# Patient Record
Sex: FEMALE | Race: WHITE | Employment: OTHER | URBAN - METROPOLITAN AREA
[De-identification: names, ages, dates, MRNs, and addresses within clinical notes are randomized per-mention and may not be internally consistent; named-entity substitution may affect disease eponyms.]

---

## 2020-12-03 ENCOUNTER — HOSPITAL ENCOUNTER (INPATIENT)
Age: 70
LOS: 4 days | Discharge: ANOTHER HOSPITAL | DRG: 291 | End: 2020-12-08
Attending: SPECIALIST | Admitting: INTERNAL MEDICINE
Payer: MEDICARE

## 2020-12-03 ENCOUNTER — APPOINTMENT (OUTPATIENT)
Dept: GENERAL RADIOLOGY | Age: 70
DRG: 291 | End: 2020-12-03
Payer: MEDICARE

## 2020-12-03 LAB
ABSOLUTE EOS #: 0 K/UL (ref 0–0.4)
ABSOLUTE IMMATURE GRANULOCYTE: ABNORMAL K/UL (ref 0–0.3)
ABSOLUTE LYMPH #: 0.8 K/UL (ref 1–4.8)
ABSOLUTE MONO #: 0.5 K/UL (ref 0.1–1.2)
ANION GAP SERPL CALCULATED.3IONS-SCNC: 10 MMOL/L (ref 9–17)
BASOPHILS # BLD: 1 % (ref 0–2)
BASOPHILS ABSOLUTE: 0.1 K/UL (ref 0–0.2)
BNP INTERPRETATION: ABNORMAL
BUN BLDV-MCNC: 52 MG/DL (ref 8–23)
BUN/CREAT BLD: ABNORMAL (ref 9–20)
CALCIUM SERPL-MCNC: 10.3 MG/DL (ref 8.6–10.4)
CHLORIDE BLD-SCNC: 94 MMOL/L (ref 98–107)
CO2: 35 MMOL/L (ref 20–31)
CREAT SERPL-MCNC: 2.4 MG/DL (ref 0.5–0.9)
D-DIMER QUANTITATIVE: 6.55 MG/L FEU
DIFFERENTIAL TYPE: ABNORMAL
EOSINOPHILS RELATIVE PERCENT: 0 % (ref 1–4)
GFR AFRICAN AMERICAN: 24 ML/MIN
GFR NON-AFRICAN AMERICAN: 20 ML/MIN
GFR SERPL CREATININE-BSD FRML MDRD: ABNORMAL ML/MIN/{1.73_M2}
GFR SERPL CREATININE-BSD FRML MDRD: ABNORMAL ML/MIN/{1.73_M2}
GLUCOSE BLD-MCNC: 147 MG/DL (ref 70–99)
HCT VFR BLD CALC: 38.2 % (ref 36–46)
HEMOGLOBIN: 12.3 G/DL (ref 12–16)
IMMATURE GRANULOCYTES: ABNORMAL %
LACTIC ACID: 2.4 MMOL/L (ref 0.5–2.2)
LYMPHOCYTES # BLD: 8 % (ref 24–44)
MCH RBC QN AUTO: 31.3 PG (ref 26–34)
MCHC RBC AUTO-ENTMCNC: 32.4 G/DL (ref 31–37)
MCV RBC AUTO: 96.8 FL (ref 80–100)
MONOCYTES # BLD: 5 % (ref 2–11)
NRBC AUTOMATED: ABNORMAL PER 100 WBC
PDW BLD-RTO: 16.5 % (ref 12.5–15.4)
PLATELET # BLD: 186 K/UL (ref 140–450)
PLATELET ESTIMATE: ABNORMAL
PMV BLD AUTO: 9.9 FL (ref 6–12)
POTASSIUM SERPL-SCNC: 5 MMOL/L (ref 3.7–5.3)
PRO-BNP: ABNORMAL PG/ML
RBC # BLD: 3.94 M/UL (ref 4–5.2)
RBC # BLD: ABNORMAL 10*6/UL
SARS-COV-2, RAPID: NOT DETECTED
SARS-COV-2: NORMAL
SARS-COV-2: NORMAL
SEG NEUTROPHILS: 86 % (ref 36–66)
SEGMENTED NEUTROPHILS ABSOLUTE COUNT: 8.5 K/UL (ref 1.8–7.7)
SODIUM BLD-SCNC: 139 MMOL/L (ref 135–144)
SOURCE: NORMAL
TROPONIN INTERP: ABNORMAL
TROPONIN T: ABNORMAL NG/ML
TROPONIN, HIGH SENSITIVITY: 73 NG/L (ref 0–14)
WBC # BLD: 9.8 K/UL (ref 3.5–11)
WBC # BLD: ABNORMAL 10*3/UL

## 2020-12-03 PROCEDURE — 84484 ASSAY OF TROPONIN QUANT: CPT

## 2020-12-03 PROCEDURE — U0002 COVID-19 LAB TEST NON-CDC: HCPCS

## 2020-12-03 PROCEDURE — 71045 X-RAY EXAM CHEST 1 VIEW: CPT

## 2020-12-03 PROCEDURE — 83605 ASSAY OF LACTIC ACID: CPT

## 2020-12-03 PROCEDURE — 83880 ASSAY OF NATRIURETIC PEPTIDE: CPT

## 2020-12-03 PROCEDURE — 87040 BLOOD CULTURE FOR BACTERIA: CPT

## 2020-12-03 PROCEDURE — 80048 BASIC METABOLIC PNL TOTAL CA: CPT

## 2020-12-03 PROCEDURE — 99285 EMERGENCY DEPT VISIT HI MDM: CPT

## 2020-12-03 PROCEDURE — 85379 FIBRIN DEGRADATION QUANT: CPT

## 2020-12-03 PROCEDURE — 36415 COLL VENOUS BLD VENIPUNCTURE: CPT

## 2020-12-03 PROCEDURE — 85025 COMPLETE CBC W/AUTO DIFF WBC: CPT

## 2020-12-03 ASSESSMENT — PAIN DESCRIPTION - LOCATION: LOCATION: COCCYX

## 2020-12-03 ASSESSMENT — PAIN SCALES - GENERAL: PAINLEVEL_OUTOF10: 6

## 2020-12-04 ENCOUNTER — APPOINTMENT (OUTPATIENT)
Dept: GENERAL RADIOLOGY | Age: 70
DRG: 291 | End: 2020-12-04
Payer: MEDICARE

## 2020-12-04 ENCOUNTER — APPOINTMENT (OUTPATIENT)
Dept: NUCLEAR MEDICINE | Age: 70
DRG: 291 | End: 2020-12-04
Payer: MEDICARE

## 2020-12-04 PROBLEM — J44.9 COPD (CHRONIC OBSTRUCTIVE PULMONARY DISEASE) (HCC): Status: ACTIVE | Noted: 2018-04-05

## 2020-12-04 PROBLEM — I48.4 ATYPICAL ATRIAL FLUTTER (HCC): Status: ACTIVE | Noted: 2019-07-10

## 2020-12-04 PROBLEM — G47.30 SLEEP APNEA: Status: ACTIVE | Noted: 2018-04-05

## 2020-12-04 PROBLEM — I50.40 COMBINED SYSTOLIC AND DIASTOLIC CONGESTIVE HEART FAILURE (HCC): Status: ACTIVE | Noted: 2020-12-04

## 2020-12-04 PROBLEM — J44.1 CHRONIC OBSTRUCTIVE PULMONARY DISEASE WITH ACUTE EXACERBATION (HCC): Status: ACTIVE | Noted: 2018-04-05

## 2020-12-04 PROBLEM — R09.02 HYPOXIA: Status: ACTIVE | Noted: 2020-12-04

## 2020-12-04 PROBLEM — C41.9 MALIGNANT NEOPLASM OF BONE (HCC): Status: ACTIVE | Noted: 2018-04-05

## 2020-12-04 PROBLEM — I50.9 CHF (CONGESTIVE HEART FAILURE) (HCC): Status: ACTIVE | Noted: 2020-12-04

## 2020-12-04 PROBLEM — I10 HYPERTENSION: Status: ACTIVE | Noted: 2018-04-05

## 2020-12-04 PROBLEM — J18.9 PNEUMONIA DUE TO INFECTIOUS ORGANISM: Status: ACTIVE | Noted: 2020-12-03

## 2020-12-04 PROBLEM — I50.43 ACUTE ON CHRONIC COMBINED SYSTOLIC AND DIASTOLIC CONGESTIVE HEART FAILURE (HCC): Status: ACTIVE | Noted: 2020-12-04

## 2020-12-04 PROBLEM — G62.9 NEUROPATHY: Status: ACTIVE | Noted: 2018-04-05

## 2020-12-04 PROBLEM — J96.20 ACUTE ON CHRONIC RESPIRATORY FAILURE (HCC): Status: ACTIVE | Noted: 2020-12-04

## 2020-12-04 PROBLEM — I48.0 PAROXYSMAL ATRIAL FIBRILLATION (HCC): Status: ACTIVE | Noted: 2018-04-10

## 2020-12-04 LAB
ANION GAP SERPL CALCULATED.3IONS-SCNC: 6 MMOL/L (ref 9–17)
BUN BLDV-MCNC: 54 MG/DL (ref 8–23)
BUN/CREAT BLD: ABNORMAL (ref 9–20)
CALCIUM SERPL-MCNC: 10.1 MG/DL (ref 8.6–10.4)
CHLORIDE BLD-SCNC: 99 MMOL/L (ref 98–107)
CO2: 36 MMOL/L (ref 20–31)
CREAT SERPL-MCNC: 2.07 MG/DL (ref 0.5–0.9)
FIO2: 75
GFR AFRICAN AMERICAN: 29 ML/MIN
GFR NON-AFRICAN AMERICAN: 24 ML/MIN
GFR SERPL CREATININE-BSD FRML MDRD: ABNORMAL ML/MIN/{1.73_M2}
GFR SERPL CREATININE-BSD FRML MDRD: ABNORMAL ML/MIN/{1.73_M2}
GLUCOSE BLD-MCNC: 128 MG/DL (ref 70–99)
GLUCOSE BLD-MCNC: 338 MG/DL (ref 65–105)
GLUCOSE BLD-MCNC: 348 MG/DL (ref 65–105)
HCT VFR BLD CALC: 37.8 % (ref 36–46)
HEMOGLOBIN: 12.3 G/DL (ref 12–16)
LACTIC ACID, WHOLE BLOOD: NORMAL MMOL/L (ref 0.7–2.1)
LACTIC ACID: 2 MMOL/L (ref 0.5–2.2)
LV EF: 33 %
LVEF MODALITY: NORMAL
MAGNESIUM: 1.9 MG/DL (ref 1.6–2.6)
MCH RBC QN AUTO: 31.3 PG (ref 26–34)
MCHC RBC AUTO-ENTMCNC: 32.5 G/DL (ref 31–37)
MCV RBC AUTO: 96.3 FL (ref 80–100)
NEGATIVE BASE EXCESS, ART: ABNORMAL (ref 0–2)
NRBC AUTOMATED: ABNORMAL PER 100 WBC
O2 DEVICE/FLOW/%: ABNORMAL
PARTIAL THROMBOPLASTIN TIME: 29.4 SEC (ref 21.3–31.3)
PATIENT TEMP: ABNORMAL
PDW BLD-RTO: 16.6 % (ref 12.5–15.4)
PLATELET # BLD: 148 K/UL (ref 140–450)
PMV BLD AUTO: 9.8 FL (ref 6–12)
POC HCO3: 38.5 MMOL/L (ref 22–27)
POC O2 SATURATION: 96 %
POC PCO2 TEMP: ABNORMAL MM HG
POC PCO2: 64 MM HG (ref 32–45)
POC PH TEMP: ABNORMAL
POC PH: 7.38 (ref 7.35–7.45)
POC PO2 TEMP: ABNORMAL MM HG
POC PO2: 89 MM HG (ref 75–95)
POSITIVE BASE EXCESS, ART: 13 (ref 0–2)
POTASSIUM SERPL-SCNC: 4.6 MMOL/L (ref 3.7–5.3)
PROCALCITONIN: 0.95 NG/ML
RBC # BLD: 3.93 M/UL (ref 4–5.2)
SODIUM BLD-SCNC: 141 MMOL/L (ref 135–144)
TCO2 (CALC), ART: 40 MMOL/L (ref 23–28)
TROPONIN INTERP: ABNORMAL
TROPONIN T: ABNORMAL NG/ML
TROPONIN, HIGH SENSITIVITY: 61 NG/L (ref 0–14)
TROPONIN, HIGH SENSITIVITY: 63 NG/L (ref 0–14)
TROPONIN, HIGH SENSITIVITY: 68 NG/L (ref 0–14)
WBC # BLD: 7.5 K/UL (ref 3.5–11)

## 2020-12-04 PROCEDURE — 84145 PROCALCITONIN (PCT): CPT

## 2020-12-04 PROCEDURE — 87070 CULTURE OTHR SPECIMN AEROBIC: CPT

## 2020-12-04 PROCEDURE — 2580000003 HC RX 258: Performed by: NURSE PRACTITIONER

## 2020-12-04 PROCEDURE — 6370000000 HC RX 637 (ALT 250 FOR IP): Performed by: NURSE PRACTITIONER

## 2020-12-04 PROCEDURE — 2000000000 HC ICU R&B

## 2020-12-04 PROCEDURE — 99223 1ST HOSP IP/OBS HIGH 75: CPT | Performed by: INTERNAL MEDICINE

## 2020-12-04 PROCEDURE — 94640 AIRWAY INHALATION TREATMENT: CPT

## 2020-12-04 PROCEDURE — 6360000002 HC RX W HCPCS: Performed by: NURSE PRACTITIONER

## 2020-12-04 PROCEDURE — 83605 ASSAY OF LACTIC ACID: CPT

## 2020-12-04 PROCEDURE — A9540 TC99M MAA: HCPCS | Performed by: NURSE PRACTITIONER

## 2020-12-04 PROCEDURE — 94660 CPAP INITIATION&MGMT: CPT

## 2020-12-04 PROCEDURE — 93005 ELECTROCARDIOGRAM TRACING: CPT | Performed by: SPECIALIST

## 2020-12-04 PROCEDURE — 83735 ASSAY OF MAGNESIUM: CPT

## 2020-12-04 PROCEDURE — 6360000002 HC RX W HCPCS: Performed by: INTERNAL MEDICINE

## 2020-12-04 PROCEDURE — 94761 N-INVAS EAR/PLS OXIMETRY MLT: CPT

## 2020-12-04 PROCEDURE — 36600 WITHDRAWAL OF ARTERIAL BLOOD: CPT

## 2020-12-04 PROCEDURE — 82803 BLOOD GASES ANY COMBINATION: CPT

## 2020-12-04 PROCEDURE — 80048 BASIC METABOLIC PNL TOTAL CA: CPT

## 2020-12-04 PROCEDURE — 36415 COLL VENOUS BLD VENIPUNCTURE: CPT

## 2020-12-04 PROCEDURE — 85027 COMPLETE CBC AUTOMATED: CPT

## 2020-12-04 PROCEDURE — 78580 LUNG PERFUSION IMAGING: CPT

## 2020-12-04 PROCEDURE — 85730 THROMBOPLASTIN TIME PARTIAL: CPT

## 2020-12-04 PROCEDURE — 6360000002 HC RX W HCPCS: Performed by: SPECIALIST

## 2020-12-04 PROCEDURE — 2700000000 HC OXYGEN THERAPY PER DAY

## 2020-12-04 PROCEDURE — 84484 ASSAY OF TROPONIN QUANT: CPT

## 2020-12-04 PROCEDURE — 3430000000 HC RX DIAGNOSTIC RADIOPHARMACEUTICAL: Performed by: NURSE PRACTITIONER

## 2020-12-04 PROCEDURE — 86403 PARTICLE AGGLUT ANTBDY SCRN: CPT

## 2020-12-04 PROCEDURE — 93306 TTE W/DOPPLER COMPLETE: CPT

## 2020-12-04 PROCEDURE — 2580000003 HC RX 258: Performed by: SPECIALIST

## 2020-12-04 PROCEDURE — 82947 ASSAY GLUCOSE BLOOD QUANT: CPT

## 2020-12-04 PROCEDURE — APPSS180 APP SPLIT SHARED TIME > 60 MINUTES: Performed by: NURSE PRACTITIONER

## 2020-12-04 PROCEDURE — 87205 SMEAR GRAM STAIN: CPT

## 2020-12-04 PROCEDURE — 6370000000 HC RX 637 (ALT 250 FOR IP): Performed by: INTERNAL MEDICINE

## 2020-12-04 RX ORDER — DEXTROSE MONOHYDRATE 25 G/50ML
12.5 INJECTION, SOLUTION INTRAVENOUS PRN
Status: DISCONTINUED | OUTPATIENT
Start: 2020-12-04 | End: 2020-12-08 | Stop reason: HOSPADM

## 2020-12-04 RX ORDER — MEGESTROL ACETATE 40 MG/1
40 TABLET ORAL 2 TIMES DAILY
COMMUNITY

## 2020-12-04 RX ORDER — IPRATROPIUM BROMIDE AND ALBUTEROL SULFATE 2.5; .5 MG/3ML; MG/3ML
1 SOLUTION RESPIRATORY (INHALATION)
Status: DISCONTINUED | OUTPATIENT
Start: 2020-12-04 | End: 2020-12-08 | Stop reason: HOSPADM

## 2020-12-04 RX ORDER — CARVEDILOL 3.12 MG/1
3.12 TABLET ORAL 2 TIMES DAILY WITH MEALS
Status: DISCONTINUED | OUTPATIENT
Start: 2020-12-05 | End: 2020-12-07

## 2020-12-04 RX ORDER — ACETAZOLAMIDE 500 MG/5ML
250 INJECTION, POWDER, LYOPHILIZED, FOR SOLUTION INTRAVENOUS EVERY 12 HOURS
Status: DISCONTINUED | OUTPATIENT
Start: 2020-12-04 | End: 2020-12-04

## 2020-12-04 RX ORDER — POLYETHYLENE GLYCOL 3350 17 G/17G
17 POWDER, FOR SOLUTION ORAL DAILY PRN
Status: DISCONTINUED | OUTPATIENT
Start: 2020-12-04 | End: 2020-12-08 | Stop reason: HOSPADM

## 2020-12-04 RX ORDER — HEPARIN SODIUM 1000 [USP'U]/ML
80 INJECTION, SOLUTION INTRAVENOUS; SUBCUTANEOUS PRN
Status: DISCONTINUED | OUTPATIENT
Start: 2020-12-04 | End: 2020-12-04

## 2020-12-04 RX ORDER — METHYLPREDNISOLONE SODIUM SUCCINATE 125 MG/2ML
60 INJECTION, POWDER, LYOPHILIZED, FOR SOLUTION INTRAMUSCULAR; INTRAVENOUS EVERY 6 HOURS
Status: DISCONTINUED | OUTPATIENT
Start: 2020-12-04 | End: 2020-12-04

## 2020-12-04 RX ORDER — HEPARIN SODIUM 1000 [USP'U]/ML
80 INJECTION, SOLUTION INTRAVENOUS; SUBCUTANEOUS ONCE
Status: COMPLETED | OUTPATIENT
Start: 2020-12-04 | End: 2020-12-04

## 2020-12-04 RX ORDER — ACETAMINOPHEN 325 MG/1
650 TABLET ORAL EVERY 6 HOURS PRN
Status: DISCONTINUED | OUTPATIENT
Start: 2020-12-04 | End: 2020-12-08 | Stop reason: HOSPADM

## 2020-12-04 RX ORDER — ALBUTEROL SULFATE 2.5 MG/3ML
2.5 SOLUTION RESPIRATORY (INHALATION)
Status: DISCONTINUED | OUTPATIENT
Start: 2020-12-04 | End: 2020-12-08 | Stop reason: HOSPADM

## 2020-12-04 RX ORDER — METHYLPREDNISOLONE SODIUM SUCCINATE 125 MG/2ML
125 INJECTION, POWDER, LYOPHILIZED, FOR SOLUTION INTRAMUSCULAR; INTRAVENOUS ONCE
Status: COMPLETED | OUTPATIENT
Start: 2020-12-04 | End: 2020-12-04

## 2020-12-04 RX ORDER — OXYCODONE HYDROCHLORIDE AND ACETAMINOPHEN 5; 325 MG/1; MG/1
1 TABLET ORAL EVERY 6 HOURS PRN
Status: ON HOLD | COMMUNITY
End: 2020-12-09 | Stop reason: SDUPTHER

## 2020-12-04 RX ORDER — SODIUM CHLORIDE 0.9 % (FLUSH) 0.9 %
10 SYRINGE (ML) INJECTION ONCE
Status: DISCONTINUED | OUTPATIENT
Start: 2020-12-04 | End: 2020-12-08 | Stop reason: HOSPADM

## 2020-12-04 RX ORDER — ONDANSETRON 2 MG/ML
4 INJECTION INTRAMUSCULAR; INTRAVENOUS EVERY 6 HOURS PRN
Status: DISCONTINUED | OUTPATIENT
Start: 2020-12-04 | End: 2020-12-08 | Stop reason: HOSPADM

## 2020-12-04 RX ORDER — DILTIAZEM HYDROCHLORIDE 240 MG/1
360 CAPSULE, EXTENDED RELEASE ORAL DAILY
Status: ON HOLD | COMMUNITY
End: 2020-12-09 | Stop reason: HOSPADM

## 2020-12-04 RX ORDER — DEXTROSE MONOHYDRATE 50 MG/ML
100 INJECTION, SOLUTION INTRAVENOUS PRN
Status: DISCONTINUED | OUTPATIENT
Start: 2020-12-04 | End: 2020-12-08 | Stop reason: HOSPADM

## 2020-12-04 RX ORDER — OMEPRAZOLE 20 MG/1
20 CAPSULE, DELAYED RELEASE ORAL DAILY
COMMUNITY

## 2020-12-04 RX ORDER — LATANOPROST 50 UG/ML
1 SOLUTION/ DROPS OPHTHALMIC NIGHTLY
COMMUNITY

## 2020-12-04 RX ORDER — HEPARIN SODIUM 1000 [USP'U]/ML
40 INJECTION, SOLUTION INTRAVENOUS; SUBCUTANEOUS PRN
Status: DISCONTINUED | OUTPATIENT
Start: 2020-12-04 | End: 2020-12-04

## 2020-12-04 RX ORDER — OXYCODONE HYDROCHLORIDE AND ACETAMINOPHEN 5; 325 MG/1; MG/1
1 TABLET ORAL EVERY 6 HOURS PRN
Status: DISCONTINUED | OUTPATIENT
Start: 2020-12-04 | End: 2020-12-08 | Stop reason: HOSPADM

## 2020-12-04 RX ORDER — LATANOPROST 50 UG/ML
1 SOLUTION/ DROPS OPHTHALMIC NIGHTLY
Status: DISCONTINUED | OUTPATIENT
Start: 2020-12-04 | End: 2020-12-08 | Stop reason: HOSPADM

## 2020-12-04 RX ORDER — DILTIAZEM HYDROCHLORIDE 180 MG/1
360 CAPSULE, COATED, EXTENDED RELEASE ORAL DAILY
Status: DISCONTINUED | OUTPATIENT
Start: 2020-12-04 | End: 2020-12-06

## 2020-12-04 RX ORDER — SODIUM CHLORIDE 0.9 % (FLUSH) 0.9 %
10 SYRINGE (ML) INJECTION PRN
Status: DISCONTINUED | OUTPATIENT
Start: 2020-12-04 | End: 2020-12-08 | Stop reason: HOSPADM

## 2020-12-04 RX ORDER — FUROSEMIDE 10 MG/ML
40 INJECTION INTRAMUSCULAR; INTRAVENOUS EVERY 12 HOURS
Status: DISCONTINUED | OUTPATIENT
Start: 2020-12-04 | End: 2020-12-06

## 2020-12-04 RX ORDER — PANTOPRAZOLE SODIUM 40 MG/1
40 TABLET, DELAYED RELEASE ORAL
Status: DISCONTINUED | OUTPATIENT
Start: 2020-12-04 | End: 2020-12-08 | Stop reason: HOSPADM

## 2020-12-04 RX ORDER — TRIAMCINOLONE ACETONIDE 0.25 MG/G
CREAM TOPICAL 2 TIMES DAILY PRN
Status: ON HOLD | COMMUNITY
End: 2020-12-09 | Stop reason: HOSPADM

## 2020-12-04 RX ORDER — FUROSEMIDE 10 MG/ML
40 INJECTION INTRAMUSCULAR; INTRAVENOUS EVERY 12 HOURS
Status: DISCONTINUED | OUTPATIENT
Start: 2020-12-05 | End: 2020-12-04

## 2020-12-04 RX ORDER — SODIUM CHLORIDE 0.9 % (FLUSH) 0.9 %
10 SYRINGE (ML) INJECTION EVERY 12 HOURS SCHEDULED
Status: DISCONTINUED | OUTPATIENT
Start: 2020-12-04 | End: 2020-12-08 | Stop reason: HOSPADM

## 2020-12-04 RX ORDER — NICOTINE POLACRILEX 4 MG
15 LOZENGE BUCCAL PRN
Status: DISCONTINUED | OUTPATIENT
Start: 2020-12-04 | End: 2020-12-08 | Stop reason: HOSPADM

## 2020-12-04 RX ORDER — ASPIRIN 81 MG/1
81 TABLET ORAL DAILY
Status: DISCONTINUED | OUTPATIENT
Start: 2020-12-04 | End: 2020-12-08 | Stop reason: HOSPADM

## 2020-12-04 RX ORDER — PROMETHAZINE HYDROCHLORIDE 25 MG/1
12.5 TABLET ORAL EVERY 6 HOURS PRN
Status: DISCONTINUED | OUTPATIENT
Start: 2020-12-04 | End: 2020-12-08 | Stop reason: HOSPADM

## 2020-12-04 RX ORDER — ASPIRIN 81 MG/1
81 TABLET ORAL DAILY
COMMUNITY

## 2020-12-04 RX ORDER — NICOTINE POLACRILEX 2 MG
1000 GUM BUCCAL
COMMUNITY

## 2020-12-04 RX ORDER — METHYLPREDNISOLONE SODIUM SUCCINATE 40 MG/ML
40 INJECTION, POWDER, LYOPHILIZED, FOR SOLUTION INTRAMUSCULAR; INTRAVENOUS EVERY 8 HOURS
Status: DISCONTINUED | OUTPATIENT
Start: 2020-12-05 | End: 2020-12-05

## 2020-12-04 RX ORDER — ACETAMINOPHEN 650 MG/1
650 SUPPOSITORY RECTAL EVERY 6 HOURS PRN
Status: DISCONTINUED | OUTPATIENT
Start: 2020-12-04 | End: 2020-12-08 | Stop reason: HOSPADM

## 2020-12-04 RX ORDER — FUROSEMIDE 10 MG/ML
40 INJECTION INTRAMUSCULAR; INTRAVENOUS ONCE
Status: COMPLETED | OUTPATIENT
Start: 2020-12-04 | End: 2020-12-04

## 2020-12-04 RX ORDER — MEGESTROL ACETATE 40 MG/ML
40 SUSPENSION ORAL 2 TIMES DAILY
Status: DISCONTINUED | OUTPATIENT
Start: 2020-12-04 | End: 2020-12-08 | Stop reason: HOSPADM

## 2020-12-04 RX ORDER — HEPARIN SODIUM 10000 [USP'U]/100ML
18 INJECTION, SOLUTION INTRAVENOUS CONTINUOUS
Status: DISCONTINUED | OUTPATIENT
Start: 2020-12-04 | End: 2020-12-04

## 2020-12-04 RX ADMIN — HEPARIN SODIUM 9660 UNITS: 1000 INJECTION INTRAVENOUS; SUBCUTANEOUS at 17:04

## 2020-12-04 RX ADMIN — INSULIN LISPRO 4 UNITS: 100 INJECTION, SOLUTION INTRAVENOUS; SUBCUTANEOUS at 18:33

## 2020-12-04 RX ADMIN — SODIUM CHLORIDE, PRESERVATIVE FREE 10 ML: 5 INJECTION INTRAVENOUS at 10:49

## 2020-12-04 RX ADMIN — IPRATROPIUM BROMIDE AND ALBUTEROL SULFATE 1 AMPULE: .5; 3 SOLUTION RESPIRATORY (INHALATION) at 15:26

## 2020-12-04 RX ADMIN — LATANOPROST 1 DROP: 50 SOLUTION OPHTHALMIC at 20:52

## 2020-12-04 RX ADMIN — FUROSEMIDE 40 MG: 10 INJECTION, SOLUTION INTRAMUSCULAR; INTRAVENOUS at 18:33

## 2020-12-04 RX ADMIN — OXYCODONE HYDROCHLORIDE AND ACETAMINOPHEN 1 TABLET: 5; 325 TABLET ORAL at 04:03

## 2020-12-04 RX ADMIN — CEFTRIAXONE SODIUM 1 G: 1 INJECTION, POWDER, FOR SOLUTION INTRAMUSCULAR; INTRAVENOUS at 00:55

## 2020-12-04 RX ADMIN — IPRATROPIUM BROMIDE AND ALBUTEROL SULFATE 1 AMPULE: .5; 3 SOLUTION RESPIRATORY (INHALATION) at 20:19

## 2020-12-04 RX ADMIN — ASPIRIN 81 MG: 81 TABLET, COATED ORAL at 10:47

## 2020-12-04 RX ADMIN — DILTIAZEM HYDROCHLORIDE 360 MG: 180 CAPSULE, COATED, EXTENDED RELEASE ORAL at 10:47

## 2020-12-04 RX ADMIN — IPRATROPIUM BROMIDE AND ALBUTEROL SULFATE 1 AMPULE: .5; 3 SOLUTION RESPIRATORY (INHALATION) at 10:25

## 2020-12-04 RX ADMIN — APIXABAN 5 MG: 5 TABLET, FILM COATED ORAL at 10:47

## 2020-12-04 RX ADMIN — METHYLPREDNISOLONE SODIUM SUCCINATE 125 MG: 125 INJECTION, POWDER, FOR SOLUTION INTRAMUSCULAR; INTRAVENOUS at 10:47

## 2020-12-04 RX ADMIN — FUROSEMIDE 40 MG: 10 INJECTION, SOLUTION INTRAMUSCULAR; INTRAVENOUS at 10:46

## 2020-12-04 RX ADMIN — MEGESTROL ACETATE 40 MG: 40 SUSPENSION ORAL at 10:48

## 2020-12-04 RX ADMIN — Medication 4.9 MILLICURIE: at 08:22

## 2020-12-04 RX ADMIN — AZITHROMYCIN MONOHYDRATE 500 MG: 500 INJECTION, POWDER, LYOPHILIZED, FOR SOLUTION INTRAVENOUS at 01:27

## 2020-12-04 RX ADMIN — HEPARIN SODIUM AND DEXTROSE 16 UNITS/KG/HR: 10000; 5 INJECTION INTRAVENOUS at 17:03

## 2020-12-04 RX ADMIN — MEGESTROL ACETATE 40 MG: 40 SUSPENSION ORAL at 20:48

## 2020-12-04 RX ADMIN — PANTOPRAZOLE SODIUM 40 MG: 40 TABLET, DELAYED RELEASE ORAL at 10:46

## 2020-12-04 RX ADMIN — APIXABAN 5 MG: 5 TABLET, FILM COATED ORAL at 20:38

## 2020-12-04 RX ADMIN — SODIUM CHLORIDE, PRESERVATIVE FREE 10 ML: 5 INJECTION INTRAVENOUS at 20:56

## 2020-12-04 RX ADMIN — ENOXAPARIN SODIUM 60 MG: 60 INJECTION SUBCUTANEOUS at 00:55

## 2020-12-04 RX ADMIN — Medication 10 ML: at 08:22

## 2020-12-04 RX ADMIN — INSULIN LISPRO 2 UNITS: 100 INJECTION, SOLUTION INTRAVENOUS; SUBCUTANEOUS at 20:43

## 2020-12-04 RX ADMIN — METHYLPREDNISOLONE SODIUM SUCCINATE 60 MG: 125 INJECTION, POWDER, FOR SOLUTION INTRAMUSCULAR; INTRAVENOUS at 17:05

## 2020-12-04 RX ADMIN — ACETAMINOPHEN 650 MG: 325 TABLET ORAL at 10:47

## 2020-12-04 ASSESSMENT — PAIN SCALES - GENERAL
PAINLEVEL_OUTOF10: 6
PAINLEVEL_OUTOF10: 0
PAINLEVEL_OUTOF10: 3

## 2020-12-04 ASSESSMENT — PAIN DESCRIPTION - PROGRESSION
CLINICAL_PROGRESSION: NOT CHANGED

## 2020-12-04 ASSESSMENT — PAIN DESCRIPTION - ONSET
ONSET: ON-GOING
ONSET: ON-GOING

## 2020-12-04 ASSESSMENT — PAIN DESCRIPTION - FREQUENCY
FREQUENCY: CONTINUOUS
FREQUENCY: CONTINUOUS

## 2020-12-04 ASSESSMENT — ENCOUNTER SYMPTOMS
ABDOMINAL PAIN: 0
SHORTNESS OF BREATH: 1
NAUSEA: 0
DIARRHEA: 0
WHEEZING: 1
CONSTIPATION: 1
BLOOD IN STOOL: 0
VOMITING: 0
STRIDOR: 0
COUGH: 1

## 2020-12-04 ASSESSMENT — PAIN DESCRIPTION - DESCRIPTORS
DESCRIPTORS: ACHING
DESCRIPTORS: ACHING

## 2020-12-04 ASSESSMENT — PAIN DESCRIPTION - ORIENTATION
ORIENTATION: LEFT
ORIENTATION: LEFT

## 2020-12-04 ASSESSMENT — PAIN DESCRIPTION - PAIN TYPE
TYPE: CHRONIC PAIN
TYPE: CHRONIC PAIN

## 2020-12-04 ASSESSMENT — PAIN DESCRIPTION - LOCATION
LOCATION: KNEE
LOCATION: KNEE

## 2020-12-04 NOTE — PROGRESS NOTES
Occupational [address]  Occupational Therapy Not Seen Note    DATE: 2020  Name: Cristina Funes  : 1950  MRN: [id number]    Patient not available for Occupational Therapy due to:    [] Testing:    [] Hemodialysis    [] Blood Transfusion in Progress    []Refusal by Patient:    [] Surgery/Procedure:    [] Strict Bedrest    [] Sedation    [] Spine Precautions     [] Pt with medical decline and not appropriate for continued therapy services. Spoke with pt/family and OT services to be defered. [] Pt independent with functional mobility and functional tasks.  Pt with no OT acute care needs at this time, will defer OT eval.    [x] Other: Spoke to RN/NP/RT and pt not medically appropriate for therapy this date       Next Scheduled Treatment: Will check back 2020    Lemon Lise OTR/L

## 2020-12-04 NOTE — H&P
[facility]  Office: [address], DO, Christie Minor, DO, Chata Teresa, DO, Mann Pizanoers Blood, DO, Shy Donovan MD, Regan Brennan MD, Ko Mondragon MD, Ari French MD, Shauna Bedolla MD, Renetta Crenshaw MD, Shruthi Ivy MD, Beverly Gutierrez MD, Mbonu Dusty Babinski, MD, John Mcelroy DO, Hira Maurer MD, Marley De Oliveira MD, Isa Varner DO, Lashae Rdz MD,  Aníbal Stauffer DO, Lu Allen MD, Isabelle Conklin MD, Xavi Genao, [facility], [facility], CNP, Rebecca Marie, CNP, Ida Talbot, CNS, Miri Walter, CNP, Hosea Chapin, [facility], Jose F Espinoza, CNP, Donna Garcia, CNP, Ayse Robles, CNP, Román Billings PA-C, Annel Philip, [facility], Lamine Pickering, CNP, Norberto Cruz, CNP, Kishan Barone, CNP, Phill Gutierres, CNP, Marla Sanchez, [facility]   [address] [facility]    HISTORY AND PHYSICAL EXAMINATION            Date:   12/4/2020  Patient name:  Wes Abdi  Date of admission:  12/3/2020 10:16 PM  MRN:   [id number]  Account:  [de-identified]  YOB: 1950  PCP:    Jeny Mendoza MD  Room:   325/325-01  Code Status:    Full Code    Chief Complaint:     SOB    History Obtained From:     Patient. Medical records requested from [facility]    History of Present Illness:     Wes Abdi is a 78 y.o. Non-/non  female who presents with Shortness of Breath (70s per ems from nursing home)   and is admitted to the hospital for the management of Hypoxia. The patient was brought to the ER per EMS related to hypoxia. The patient states she was just discharged to a facilty a hospitalization in [facility]. She presented there because of pain in her left foot, frequent falls, and weakness. She was found to be in A fib with RVR. A chest xray showed right pleural effusion and atelectasis or infiltrate. BNP during that visit less than 900. She was discharged on bumex.  According the ER note the patients sats were in the 70's Provider, MD   Biotin 1 MG CAPS Take 1,000 mcg by mouth   Yes Historical Provider, MD   denosumab (XGEVA) 120 MG/1.7ML SOLN SC injection Inject 120 mg into the skin every 30 days Given on the 16th of the month subq   Yes Historical Provider, MD   dilTIAZem (DILACOR XR) 240 MG extended release capsule Take 360 mg by mouth daily   Yes Historical Provider, MD   FULVESTRANT IM Inject 500 mg into the muscle every 30 days On the 16th of the month   Yes Historical Provider, MD   latanoprost (XALATAN) 0.005 % ophthalmic solution Place 1 drop into both eyes nightly   Yes Historical Provider, MD   megestrol (MEGACE) 40 MG tablet Take 40 mg by mouth 2 times daily   Yes Historical Provider, MD   metFORMIN (GLUCOPHAGE) 500 MG tablet Take 500 mg by mouth 2 times daily (with meals)   Yes Historical Provider, MD   omeprazole (PRILOSEC) 20 MG delayed release capsule Take 20 mg by mouth daily   Yes Historical Provider, MD   oxyCODONE-acetaminophen (PERCOCET) 5-325 MG per tablet Take 1 tablet by mouth every 6 hours as needed for Pain. Yes Historical Provider, MD   triamcinolone (KENALOG) 0.025 % cream Apply topically 2 times daily as needed Apply Topically   Yes Historical Provider, MD        Allergies:     Ciprofloxacin    Social History:     Tobacco:    reports that she has never smoked. She has never used smokeless tobacco.  Alcohol:      reports previous alcohol use. Drug Use:  reports no history of drug use. Family History:     History reviewed. No pertinent family history. Review of Systems:     Positive and Negative as described in HPI. Review of Systems   Constitutional: Positive for fatigue. Negative for chills, diaphoresis and fever. HENT: Negative for congestion and hearing loss. Respiratory: Positive for cough, shortness of breath and wheezing. Negative for stridor. Cardiovascular: Negative for chest pain, palpitations and leg swelling. Gastrointestinal: Positive for constipation.  Negative for abdominal pain, blood in stool, diarrhea, nausea and vomiting. Genitourinary: Positive for decreased urine volume. Negative for dysuria and frequency. Musculoskeletal: Negative for myalgias. Skin: Negative for rash. Neurological: Positive for weakness. Negative for dizziness, seizures and headaches. Psychiatric/Behavioral: The patient is not nervous/anxious. Physical Exam:   BP (!) 108/96   Pulse 107   Temp 98.4 °F (36.9 °C) (Axillary)   Resp 18   Ht 5' 7.5\" (1.715 m)   Wt 266 lb 9.6 oz (120.9 kg)   SpO2 96%   BMI 41.14 kg/m²   Temp (24hrs), Av.2 °F (36.8 °C), Min:97.5 °F (36.4 °C), Max:98.6 °F (37 °C)    No results for input(s): POCGLU in the last 72 hours. Intake/Output Summary (Last 24 hours) at 2020 1748  Last data filed at 2020 1135  Gross per 24 hour   Intake --   Output 375 ml   Net -375 ml       Physical Exam  Vitals signs and nursing note reviewed. Constitutional:       Appearance: She is obese. HENT:      Mouth/Throat:      Mouth: Mucous membranes are dry. Eyes:      Conjunctiva/sclera: Conjunctivae normal.   Cardiovascular:      Rate and Rhythm: Normal rate. Rhythm irregular. Pulses:           Dorsalis pedis pulses are 1+ on the right side and 1+ on the left side. Posterior tibial pulses are 1+ on the right side and 1+ on the left side. Pulmonary:      Effort: Tachypnea present. Breath sounds: Examination of the right-upper field reveals decreased breath sounds. Examination of the left-upper field reveals decreased breath sounds. Examination of the right-middle field reveals decreased breath sounds. Examination of the left-middle field reveals decreased breath sounds. Examination of the right-lower field reveals decreased breath sounds. Examination of the left-lower field reveals decreased breath sounds. Decreased breath sounds present.       Comments: She seems a dyspneic during conversaion  Abdominal:      General: Bowel sounds are normal.      Palpations: Abdomen is soft. Musculoskeletal:      Right lower leg: Edema present. Left lower leg: Edema present. Skin:     General: Skin is warm. Capillary Refill: Capillary refill takes less than 2 seconds. Comments: Open area to chin tan drainage that is foul smelling  To areas in her mouth along her right and left lower gum line     Neurological:      Mental Status: She is alert. GCS: GCS eye subscore is 4. GCS verbal subscore is 5. GCS motor subscore is 6. Comments: Most of her answers seemed appropriate but she did struggle with timeline. Psychiatric:         Attention and Perception: She is inattentive. Mood and Affect: Affect is flat. Speech: Speech normal.         Behavior: Behavior is cooperative. Cognition and Memory: She exhibits impaired recent memory and impaired remote memory.          Investigations:      Laboratory Testing:  Recent Results (from the past 24 hour(s))   CBC Auto Differential    Collection Time: 12/03/20 10:12 PM   Result Value Ref Range    WBC 9.8 3.5 - 11.0 k/uL    RBC 3.94 (L) 4.0 - 5.2 m/uL    Hemoglobin 12.3 12.0 - 16.0 g/dL    Hematocrit 38.2 36 - 46 %    MCV 96.8 80 - 100 fL    MCH 31.3 26 - 34 pg    MCHC 32.4 31 - 37 g/dL    RDW 16.5 (H) 12.5 - 15.4 %    Platelets 151 800 - 865 k/uL    MPV 9.9 6.0 - 12.0 fL    NRBC Automated NOT REPORTED per 100 WBC    Differential Type NOT REPORTED     Seg Neutrophils 86 (H) 36 - 66 %    Lymphocytes 8 (L) 24 - 44 %    Monocytes 5 2 - 11 %    Eosinophils % 0 (L) 1 - 4 %    Basophils 1 0 - 2 %    Immature Granulocytes NOT REPORTED 0 %    Segs Absolute 8.50 (H) 1.8 - 7.7 k/uL    Absolute Lymph # 0.80 (L) 1.0 - 4.8 k/uL    Absolute Mono # 0.50 0.1 - 1.2 k/uL    Absolute Eos # 0.00 0.0 - 0.4 k/uL    Basophils Absolute 0.10 0.0 - 0.2 k/uL    Absolute Immature Granulocyte NOT REPORTED 0.00 - 0.30 k/uL    WBC Morphology NOT REPORTED     RBC Morphology NOT REPORTED     Platelet Estimate NOT REPORTED    Basic Metabolic Panel w/ Reflex to MG    Collection Time: 12/03/20 10:12 PM   Result Value Ref Range    Glucose 147 (H) 70 - 99 mg/dL    BUN 52 (H) 8 - 23 mg/dL    CREATININE 2.40 (H) 0.50 - 0.90 mg/dL    Bun/Cre Ratio NOT REPORTED 9 - 20    Calcium 10.3 8.6 - 10.4 mg/dL    Sodium 139 135 - 144 mmol/L    Potassium 5.0 3.7 - 5.3 mmol/L    Chloride 94 (L) 98 - 107 mmol/L    CO2 35 (H) 20 - 31 mmol/L    Anion Gap 10 9 - 17 mmol/L    GFR Non-African American 20 (L) >60 mL/min    GFR  24 (L) >60 mL/min    GFR Comment          GFR Staging NOT REPORTED    Troponin    Collection Time: 12/03/20 10:12 PM   Result Value Ref Range    Troponin, High Sensitivity 73 (HH) 0 - 14 ng/L    Troponin T NOT REPORTED <0.03 ng/mL    Troponin Interp NOT REPORTED    Brain Natriuretic Peptide    Collection Time: 12/03/20 10:12 PM   Result Value Ref Range    Pro-BNP 28,586 (H) <300 pg/mL    BNP Interpretation Pro-BNP Reference Range:    D-Dimer, Quantitative    Collection Time: 12/03/20 10:12 PM   Result Value Ref Range    D-Dimer, Quant 6.55 mg/L FEU   Culture, Blood 1    Collection Time: 12/03/20 10:12 PM    Specimen: Blood   Result Value Ref Range    Specimen Description . BLOOD     Special Requests 20 ML LEFT ARM     Culture NO GROWTH 10 HOURS    Lactic Acid    Collection Time: 12/03/20 10:12 PM   Result Value Ref Range    Lactic Acid 2.4 (H) 0.5 - 2.2 mmol/L   COVID-19    Collection Time: 12/03/20 10:41 PM    Specimen: Other   Result Value Ref Range    SARS-CoV-2          SARS-CoV-2, Rapid Not Detected Not Detected    Source . NASOPHARYNGEAL SWAB     SARS-CoV-2         Troponin    Collection Time: 12/04/20  1:15 AM   Result Value Ref Range    Troponin, High Sensitivity 68 (HH) 0 - 14 ng/L    Troponin T NOT REPORTED <0.03 ng/mL    Troponin Interp NOT REPORTED    Lactic Acid, Plasma    Collection Time: 12/04/20  1:15 AM   Result Value Ref Range    Lactic Acid 2.0 0.5 - 2.2 mmol/L    Lactic Acid, Whole Blood NOT REPORTED 0.7 - 2.1 mmol/L   Culture, Blood 1    Collection Time: 12/04/20  1:22 AM    Specimen: Blood   Result Value Ref Range    Specimen Description . BLOOD     Special Requests 10ML RIGHT HAND     Culture NO GROWTH 10 HOURS    Troponin    Collection Time: 12/04/20  6:02 AM   Result Value Ref Range    Troponin, High Sensitivity 63 (HH) 0 - 14 ng/L    Troponin T NOT REPORTED <0.03 ng/mL    Troponin Interp NOT REPORTED    BASIC METABOLIC PANEL    Collection Time: 12/04/20  9:14 AM   Result Value Ref Range    Glucose 128 (H) 70 - 99 mg/dL    BUN 54 (H) 8 - 23 mg/dL    CREATININE 2.07 (H) 0.50 - 0.90 mg/dL    Bun/Cre Ratio NOT REPORTED 9 - 20    Calcium 10.1 8.6 - 10.4 mg/dL    Sodium 141 135 - 144 mmol/L    Potassium 4.6 3.7 - 5.3 mmol/L    Chloride 99 98 - 107 mmol/L    CO2 36 (H) 20 - 31 mmol/L    Anion Gap 6 (L) 9 - 17 mmol/L    GFR Non-African American 24 (L) >60 mL/min    GFR  29 (L) >60 mL/min    GFR Comment          GFR Staging NOT REPORTED    MAGNESIUM    Collection Time: 12/04/20  9:14 AM   Result Value Ref Range    Magnesium 1.9 1.6 - 2.6 mg/dL   CBC    Collection Time: 12/04/20  9:14 AM   Result Value Ref Range    WBC 7.5 3.5 - 11.0 k/uL    RBC 3.93 (L) 4.0 - 5.2 m/uL    Hemoglobin 12.3 12.0 - 16.0 g/dL    Hematocrit 37.8 36 - 46 %    MCV 96.3 80 - 100 fL    MCH 31.3 26 - 34 pg    MCHC 32.5 31 - 37 g/dL    RDW 16.6 (H) 12.5 - 15.4 %    Platelets 072 278 - 638 k/uL    MPV 9.8 6.0 - 12.0 fL    NRBC Automated NOT REPORTED per 100 WBC   Troponin    Collection Time: 12/04/20  9:14 AM   Result Value Ref Range    Troponin, High Sensitivity 61 (HH) 0 - 14 ng/L    Troponin T NOT REPORTED <0.03 ng/mL    Troponin Interp NOT REPORTED    POC PANEL (G3)-ART    Collection Time: 12/04/20 11:02 AM   Result Value Ref Range    POC pH 7.38 7.35 - 7.45    POC pCO2 64 (H) 32 - 45 mm Hg    POC PO2 89 75 - 95 mm Hg    TCO2 (calc), Art 40 (H) 23 - 28 mmol/L    POC HCO3 38.5 (H) 22 - 27 mmol/L    Negative Base Excess, Art NOT REPORTED 0.0 - 2.0    Positive Base Excess, Art 13 (H) 0.0 - 2.0    POC O2 SAT 96 %    Pt Temp NOT REPORTED     O2 Device/Flow/% NOT REPORTED     FIO2 75.0     POC pH Temp NOT REPORTED     POC pCO2 Temp NOT REPORTED mm Hg    POC pO2 Temp NOT REPORTED mm Hg   APTT    Collection Time: 12/04/20  3:59 PM   Result Value Ref Range    PTT 29.4 21.3 - 31.3 sec       Imaging/Diagnostics:    Xr Chest Portable    Result Date: 12/3/2020  Cardiomegaly with perihilar congestion. Bilateral effusions and airspace opacities favor atelectasis. Assessment :      Hospital Problems           Last Modified POA    * (Principal) Hypoxia 12/4/2020 Yes    Chronic obstructive pulmonary disease with acute exacerbation ([address] 12/4/2020 Yes    Hypertension 12/4/2020 Yes    Paroxysmal atrial fibrillation ([address] 12/4/2020 Yes    Sleep apnea 12/4/2020 Yes    CKD (chronic kidney disease) 12/4/2020 Yes    Type 2 diabetes mellitus, without long-term current use of insulin ([address] 12/4/2020 Yes    Acute on chronic combined systolic and diastolic congestive heart failure ([address] 12/4/2020 Yes    Pneumonia due to infectious organism 12/4/2020 Yes    Acute on chronic respiratory failure ([address] 12/4/2020 Yes    Overview Signed 12/4/2020  4:51 PM by EMY Hagen CNP     2 l nc through bipap at night               Plan:     Patient status inpatient in the Medical ICU     Acute on chronic respiratory failure with hypoxia; pulmonary medicine consulted. Continue high flow pending their recommendations. Solu-Medrol IV, respiratory treatments as ordered    Acute on chronic systolic and diastolic heart failure; echo completed. Some concern of right heart strain and PE despite Eliquis. High intensity heparin drip started. Lasix 40 mg IV x1. Continue to monitor marginal blood pressure. Patient may require inotropic support. Cardiology consulted. BNP every other day    LYSSA; patient has a history of CKD; avoid nephrotoxic agents. Monitor BMP    Pneumonia; continue Rocephin and Zithromax IV. Procalcitonin pending    Type 2 diabetes; insulin sliding scale for glucose management. Hold Glucophage for now related to LYSSA    A. fib; hold Eliquis at this time. Continue Cardizem. Mechanical DVT prophylaxis for now    PPI    Pain meds for complaints of chronic left leg pain    PT OT to evaluate and treat     Continue to monitor I&O and telemetry. Recommend congestive heart failure nurse outpatient        Consultations:   IP CONSULT TO HEART FAILURE NURSE/COORDINATOR  IP CONSULT TO DIETITIAN  IP CONSULT TO PULMONOLOGY  IP CONSULT TO CARDIOLOGY     Patient is admitted as inpatient status because of co-morbidities listed above, severity of signs and symptoms as outlined, requirement for current medical therapies and most importantly because of direct risk to patient if care not provided in a hospital setting. Expected length of stay > 48 hours.     EMY Johnson CNP  12/4/2020  5:48 PM    Copy sent to Dr. Rick Tamez MD

## 2020-12-04 NOTE — ED PROVIDER NOTES
[facility] ENCOUNTER      Pt Name: Chris Ortiz  MRN: [id number]  [place] 1950  Date of evaluation: 12/3/20      CHIEF COMPLAINT       Chief Complaint   Patient presents with    Shortness of Breath     70s per ems from nursing [address]    Chris Ortiz is a 78 y.o. female who presents to the emergency department transferred via EMS due to shortness of breath. Patient was found to be hypoxic with pulse oximetry in the mid 70s. She has history of COPD, sleep apnea, hypertension, diabetes mellitus, paroxysmal atrial fibrillation, breast cancer, bone cancer, kidney stone and is on home oxygen 5 L per nasal cannula 24 hours a day. Patient has chronic cough which is unchanged but admits to having shortness of breath tonight. She received aerosol treatment prior to arrival with some relief. She denies any chest pain, abdominal pain, vomiting or diarrhea. No history of fever or chills. REVIEW OF SYSTEMS       Review of Systems    All systems reviewed and negative unless noted in HPI. The patient denies fever or constitutional symptoms. Denies vision change. Denies any sore throat or rhinorrhea. Denies any neck pain or stiffness. Denies chest pain, palpitations or diaphoresis. No nausea,  vomiting or diarrhea. Denies any dysuria. Denies urinary frequency or hematuria. Denies musculoskeletal injury or pain. Denies any weakness, numbness or focal neurologic deficit. Denies any skin rash or edema. No recent psychiatric issues. No easy bruising or bleeding. Denies any polyuria, polydypsia or history of immunocompromise. PAST MEDICAL HISTORY     COPD, paroxysmal atrial fibrillation, paroxysmal atrial tachycardia, breast cancer, bone cancer, hypertension, diabetes mellitus, kidney stone, sleep apnea    SURGICAL HISTORY      has no past surgical history on file.      Appendectomy, cholecystectomy, D&C, gastrostomy, nephrostomy, lymph node resection, breast lumpectomy, umbilical hernia repair    CURRENT MEDICATIONS       Previous Medications    No medications on file       ALLERGIES     is allergic to ciprofloxacin. FAMILY HISTORY     has no family status information on file. family history is not on file. SOCIAL HISTORY          PHYSICAL EXAM     INITIAL VITALS:  height is 5' 7.5\" (1.715 m) and weight is 120.8 kg (266 lb 6.4 oz). Her temperature is 98.2 °F (36.8 °C). Her blood pressure is 122/101 (abnormal) and her pulse is 78. Her respiration is 26 and oxygen saturation is 96%. Physical Exam  Vitals signs and nursing note reviewed. Constitutional:       Appearance: She is well-developed. HENT:      Head: Normocephalic and atraumatic. Nose: Nose normal.   Eyes:      Extraocular Movements: Extraocular movements intact. Pupils: Pupils are equal, round, and reactive to light. Neck:      Musculoskeletal: Normal range of motion and neck supple. Cardiovascular:      Rate and Rhythm: Normal rate. Rhythm regularly irregular. Heart sounds: Normal heart sounds. No murmur. Pulmonary:      Effort: Pulmonary effort is normal. No respiratory distress. Breath sounds: Decreased breath sounds present. No wheezing or rhonchi. Abdominal:      General: Bowel sounds are normal. There is no distension. Palpations: Abdomen is soft. Tenderness: There is no abdominal tenderness. Skin:     General: Skin is warm and dry. Neurological:      General: No focal deficit present. Mental Status: She is alert and oriented to person, place, and time.            DIFFERENTIAL DIAGNOSIS/ MDM:     Bronchitis, pneumonia, COPD exacerbation, CHF, ACS, Asthma, PE, Anxiety, Pneumothorax, Pulmonary Fibrosis    DIAGNOSTIC RESULTS     EKG: All EKG's are interpreted by the Emergency Department Physician who either signs or Co-signs this chart in the absence of a cardiologist.    Interpreted by Rut English MD     Rhythm: irregularly irregular  Rate: 81  Axis: normal  Conduction: abnormal - atrial fibrillation, incomplete RBBB  ST Segments: no acute change  T Waves: nonspecific change  Q Waves: none    Clinical Impression: atrial fibrilation. No acute infarction/ischemia noted. RADIOLOGY:   Non-plain film images such as CT, Ultrasound and MRI are read by the radiologist. Vermell Gum radiographic images are visualized and the radiologist interpretations are reviewed as follows:     Xr Chest Portable    Result Date: 12/3/2020  EXAMINATION: ONE XRAY VIEW OF THE CHEST 12/3/2020 10:45 pm COMPARISON: None HISTORY: ORDERING SYSTEM PROVIDED HISTORY: SOB TECHNOLOGIST PROVIDED HISTORY: SOB Reason for Exam: SOB Acuity: Acute Type of Exam: Initial Additional signs and symptoms: cough FINDINGS: Cardiomegaly. Perihilar pulmonary vasculature. Moderate right-sided and small left-sided effusions. Bibasilar airspace opacities. Right axillary surgical clips. Cardiomegaly with perihilar congestion. Bilateral effusions and airspace opacities favor atelectasis.        LABS:  Results for orders placed or performed during the hospital encounter of 12/03/20   CBC Auto Differential   Result Value Ref Range    WBC 9.8 3.5 - 11.0 k/uL    RBC 3.94 (L) 4.0 - 5.2 m/uL    Hemoglobin 12.3 12.0 - 16.0 g/dL    Hematocrit 38.2 36 - 46 %    MCV 96.8 80 - 100 fL    MCH 31.3 26 - 34 pg    MCHC 32.4 31 - 37 g/dL    RDW 16.5 (H) 12.5 - 15.4 %    Platelets 699 598 - 598 k/uL    MPV 9.9 6.0 - 12.0 fL    NRBC Automated NOT REPORTED per 100 WBC    Differential Type NOT REPORTED     Seg Neutrophils 86 (H) 36 - 66 %    Lymphocytes 8 (L) 24 - 44 %    Monocytes 5 2 - 11 %    Eosinophils % 0 (L) 1 - 4 %    Basophils 1 0 - 2 %    Immature Granulocytes NOT REPORTED 0 %    Segs Absolute 8.50 (H) 1.8 - 7.7 k/uL    Absolute Lymph # 0.80 (L) 1.0 - 4.8 k/uL    Absolute Mono # 0.50 0.1 - 1.2 k/uL    Absolute Eos # 0.00 0.0 - 0.4 k/uL    Basophils BP:  117/65 (!) 122/101    Pulse:  77 77 78   Resp:  25  26   Temp:       SpO2:  90%  96%   Weight: 120.8 kg (266 lb 6.4 oz)      Height:         -------------------------  BP: (!) 122/101, Temp: 98.2 °F (36.8 °C), Pulse: 78, Resp: 26    Orders Placed This Encounter   Medications    AND Linked Order Group     cefTRIAXone (ROCEPHIN) 1 g IVPB in 50 mL D5W minibag      Order Specific Question:   Antimicrobial Indications      Answer:   Pneumonia (CAP)     azithromycin (ZITHROMAX) 500 mg in dextrose 5 % IVPB      Order Specific Question:   Antimicrobial Indications      Answer:   Pneumonia (CAP)    enoxaparin (LOVENOX) injection 60 mg         During the emergency department course, patient was put on the monitor and supplement oxygen per warm humidified oxygen per nasal cannula. She is feeling much better and maintained her pulse oximetry in low 90s. After blood cultures were obtained, patient was given Rocephin 1 g and azithromycin 500 mg IV piggyback. She was also given Lovenox 0.5 mg/kg subcutaneously. Her renal function is abnormal and the CT chest could not be obtained. Patient will get VQ scan tomorrow morning. I discussed the case with Cristobal Young who kindly accepted the patient to be admitted on stepdown unit. CONSULTS:  Cristobal Young CNP    PROCEDURES:  None    FINAL IMPRESSION      1. Pneumonia due to organism    2. Congestive heart failure, unspecified HF chronicity, unspecified heart failure type ([address]    3. Hypoxia    4. Elevated troponin          DISPOSITION/PLAN       PATIENT REFERRED TO:  No follow-up provider specified. DISCHARGE MEDICATIONS:  New Prescriptions    No medications on file       (Please note that portions of this note were completed with a voice recognition program.  Efforts were made to edit the dictations but occasionally words are mis-transcribed.)    Lamb MD, F.A.C.E.P.   Attending Emergency Medicine Physician      Shannon Olmedo, MD  12/04/20 [id number]

## 2020-12-04 NOTE — PROGRESS NOTES
Physical Therapy  DATE: 2020    NAME: Kaiser Church  MRN: [id number]   : 1950    Patient not seen this date for Physical Therapy due to:  [] Blood transfusion in progress  [] Hemodialysis  [] Patient Declined  [] Spine Precautions   [] Strict Bedrest  [] Surgery/ Procedure  [] Testing      [x] Other- not medically appropriate for therapy this date- check back 20        [] PT is being discontinued at this time. Patient independent. No further needs. [] PT is being discontinued at this time due to declining physical/ medical status. Therapy is not appropriate at this time.     Kenji Sirera, PT

## 2020-12-04 NOTE — PROGRESS NOTES
Pharmacy Note     Renal Dose Adjustment    Jay Jay Connor is a 78 y.o. female. Pharmacist assessment of renally cleared medications. Recent Labs     12/03/20 2212   BUN 52*       Recent Labs     12/03/20 2212   CREATININE 2.40*       Estimated Creatinine Clearance: 30 mL/min (A) (based on SCr of 2.4 mg/dL (H)). Estimated CrCl using Ideal Body Weight: 22.2 mL/min (based on IBW 62.75 kg)    Height:   Ht Readings from Last 1 Encounters:   12/03/20 5' 7.5\" (1.715 m)     Weight:  Wt Readings from Last 1 Encounters:   12/04/20 266 lb 6.4 oz (120.8 kg)       The following medication dose has been adjusted based upon renal function per P&T Guidelines:             Enoxaparin 0.5 mg/kg twice daily changed to enoxaparin 0.5 mg/kg once daily.

## 2020-12-04 NOTE — PROGRESS NOTES
Pt changed to 6lpm nasal cannula prior to transport to verify whether or not patient will tolerate it.   After 5 minutes on regular nasal cannula pt spo2 has remained 94-95%

## 2020-12-04 NOTE — PROGRESS NOTES
Pt bipap settings increased to 16/8 from 12/6 due to frequent desaturations recently. I woke patient briefly and her sats quickly improved to mid 90s while awake. Increased pressure to improve her ventilation that is compromised by her obstructive sleep apnea which seems to be worse when she is positioned on her back.

## 2020-12-04 NOTE — CARE COORDINATION
Patient from [address] [place] [address] x 1 day after d/c from [facility] after short admission  - on HFNC and heparin gtt.  Follow progress

## 2020-12-04 NOTE — PROGRESS NOTES
Patient family member, Jayna Kenny back for updated. All questions answered to satisfaction. Will continue to monitor.

## 2020-12-04 NOTE — CONSULTS
None     Minutes per session: None    Stress: None   Relationships    Social connections     Talks on phone: None     Gets together: None     Attends Sikh service: None     Active member of club or organization: None     Attends meetings of clubs or organizations: None     Relationship status: None    Intimate partner violence     Fear of current or ex partner: None     Emotionally abused: None     Physically abused: None     Forced sexual activity: None   Other Topics Concern    None   Social History Narrative    None         TOBACCO:   reports that she has never smoked. She has never used smokeless tobacco.  ETOH:   reports previous alcohol use. ALLERGIES:    Allergies   Allergen Reactions    Ciprofloxacin        Home Meds:   Prior to Admission medications    Medication Sig Start Date End Date Taking?  Authorizing Provider   apixaban (ELIQUIS) 5 MG TABS tablet Take 5 mg by mouth 2 times daily   Yes Historical Provider, MD   aspirin 81 MG EC tablet Take 81 mg by mouth daily   Yes Historical Provider, MD   Biotin 1 MG CAPS Take 1,000 mcg by mouth   Yes Historical Provider, MD   denosumab (XGEVA) 120 MG/1.7ML SOLN SC injection Inject 120 mg into the skin every 30 days Given on the 16th of the month subq   Yes Historical Provider, MD   dilTIAZem (DILACOR XR) 240 MG extended release capsule Take 360 mg by mouth daily   Yes Historical Provider, MD   FULVESTRANT IM Inject 500 mg into the muscle every 30 days On the 16th of the month   Yes Historical Provider, MD   latanoprost (XALATAN) 0.005 % ophthalmic solution Place 1 drop into both eyes nightly   Yes Historical Provider, MD   megestrol (MEGACE) 40 MG tablet Take 40 mg by mouth 2 times daily   Yes Historical Provider, MD   metFORMIN (GLUCOPHAGE) 500 MG tablet Take 500 mg by mouth 2 times daily (with meals)   Yes Historical Provider, MD   omeprazole (PRILOSEC) 20 MG delayed release capsule Take 20 mg by mouth daily   Yes Historical Provider, MD oxyCODONE-acetaminophen (PERCOCET) 5-325 MG per tablet Take 1 tablet by mouth every 6 hours as needed for Pain. Yes Historical Provider, MD   triamcinolone (KENALOG) 0.025 % cream Apply topically 2 times daily as needed Apply Topically   Yes Historical Provider, MD     CURRENT MEDS :  Scheduled Meds:   ipratropium-albuterol  1 ampule Inhalation Q4H WA    [START ON 12/5/2020] carvedilol  3.125 mg Oral BID WC    sodium chloride flush  10 mL Intravenous 2 times per day    aspirin  81 mg Oral Daily    dilTIAZem  360 mg Oral Daily    latanoprost  1 drop Both Eyes Nightly    megestrol  40 mg Oral BID    pantoprazole  40 mg Oral QAM AC    sodium chloride flush  10 mL Intravenous Once    [START ON 12/5/2020] methylPREDNISolone  40 mg Intravenous Q8H    apixaban  5 mg Oral BID    furosemide  40 mg Intravenous Q12H    insulin lispro  0-6 Units Subcutaneous TID WC    insulin lispro  0-3 Units Subcutaneous Nightly       Continuous Infusions:   dextrose             REVIEW OF SYSTEMS:  CONSTITUTIONAL:   fatigue, malaise.   EYES:  negative for  double vision, blurred vision, dry eyes, eye discharge and redness  HEENT:  negative for  hearing loss, tinnitus, ear drainage, earaches and nasal congestion  RESPIRATORY:  See hpi  CARDIOVASCULAR:  negative for  chest pain, palpitations,+ orthopnea  GASTROINTESTINAL:  negative for nausea, vomiting, change in bowel habits, diarrhea, constipation, abdominal pain, pruritus, abdominal mass and abdominal distention  GENITOURINARY:  negative for frequency, dysuria, nocturia, urinary incontinence and hesitancy  HEMATOLOGIC/LYMPHATIC:  negative for easy bruising, bleeding, lymphadenopathy, petechiae and swelling/edema  ALLERGIC/IMMUNOLOGIC:  negative for recurrent infections, urticaria and drug reactions  ENDOCRINE:  negative for heat intolerance, cold intolerance, tremor, weight changes and change in bowel habits  MUSCULOSKELETAL:  negative for  myalgias, arthralgias, pain, []  Upper ext edema No  Sacral edema               CBC:   Recent Labs     12/03/20  2212 12/04/20  0914   WBC 9.8 7.5   HGB 12.3 12.3    148     BMP:    Recent Labs     12/03/20  2212 12/04/20  0914    141   K 5.0 4.6   CL 94* 99   CO2 35* 36*   BUN 52* 54*   CREATININE 2.40* 2.07*   GLUCOSE 147* 128*     Hepatic: No results for input(s): AST, ALT, ALB, BILITOT, ALKPHOS in the last 72 hours. Amylase: No results found for: AMYLASE  Lipase: No results found for: LIPASE  Troponin: No results for input(s): TROPONINI in the last 72 hours. BNP: No results for input(s): BNP in the last 72 hours. Lipids: No results for input(s): CHOL, HDL in the last 72 hours. Invalid input(s): LDLCALCU  ABGs: No results found for: PHART, PO2ART, EDZ3CVI  INR: No results for input(s): INR in the last 72 hours. Thyroid: No results found for: TSH  Urinalysis: No results for input(s): BACTERIA, BLOODU, CLARITYU, COLORU, PHUR, PROTEINU, RBCUA, SPECGRAV, BILIRUBINUR, NITRU, WBCUA, LEUKOCYTESUR, GLUCOSEU in the last 72 hours. Nm Lung Scan Perfusion Only    Result Date: 12/4/2020  Indeterminate for pulmonary embolism given presence of effusion and 2 moderate size perfusion defects. This does not meet criteria for high probability for pulmonary embolism. Xr Chest Portable    Result Date: 12/3/2020  Cardiomegaly with perihilar congestion. Bilateral effusions and airspace opacities favor atelectasis.       IMPRESSION:    Patient Active Problem List   Diagnosis Code    Hypoxia R09.02    Chronic obstructive pulmonary disease with acute exacerbation ([facility] J44.1    Hypertension I10    Malignant neoplasm of bone (HCC) C41.9    Neuropathy G62.9    Paroxysmal atrial fibrillation (HCC) I48.0    [facility] flutter ([facility] I48.4    Sleep apnea G47.30    CKD (chronic kidney disease) N18.9    Type 2 diabetes mellitus, without long-term current use of insulin (HCC) E11.9    Acute on chronic combined systolic and diastolic congestive heart failure (HCC) I50.43    Pneumonia due to infectious organism J18.9    Acute on chronic respiratory failure (HCC) J96.20    Pneumonia due to organism J18.9        Acute on chronic hypoxic respiratory failure due to acute on chronic biventricular and valvular CHF   Chronic hypercapnic and hypoxic respiratory failure   Chronic co2 retention . With compensated chronic respiratory acidosis   Acute pulmonary edema , d/l pleural effusions right >left   Pt does not have PE - Vq scan will be intermediate probability due to abnormal chest xray . Her severe  right heart dysfunction is due to combination of  left ventricular systolic and valvular dysfunction + cor pulmonale due to group 3 pulmonary HTN ( severe copd causing chronic hypoxic and hypercapnia + FINA ) .  fina + copd overlap syndrome  Copd ex   emphysema of lung   Moderate AI   Moderate MR   CKD 3   Afib  metastatic breast cancer    Active smoker 1 ppd since age 17   PLAN:      Iv diamox 2 doses then change to lasix iv 40 mg bid - access continued diuretic dose based on response . Ok to restart eliquis and dc heparin gtt from pulmonary standpoint if no objection by cardiology in light of valvular Afib . Iv solumedrol to 40 mg iv q 8 and then in 48 hrs change to po prednisone taper . Cont bronchodilator   Wean high flow - keep SATs 88-92 %   Use BiPAP at night keep SATs 88-92 %   Dc antibiotics - not pneumonia   Will recommend avoid Cardizem - negative inotropic . Once optimized will need repeat chest xray as out pt and if pleural effusions persist after adequate diuresis then will thoracentesis to R/O malignant effusion   D/w Dr Wendy Kessler         I hope this updates you on my evaluation and clinical thinking. Thank you for allowing me to participate in his care.      Sincerely,      Electronically signed by Debbie Banegas MD on 12/4/2020 at 8:57 PM

## 2020-12-04 NOTE — PROGRESS NOTES
Nutrition Note    Consulted for diet education. Pt is not appropriate for education at this time. Will address educational needs as appropriate.      Electronically signed by Kenyetta Melendez RD, LD on 12/4/20 at 11:02 AM GUSTAVO Melendez RD,LD  Clinical Dietitian  [facility]  [phone]

## 2020-12-04 NOTE — PROGRESS NOTES
bipap set up in patient room per patient request, however patient is declining at this time because she wants to eat first. Pt continues on hfnc at this time 35lpm/66%

## 2020-12-04 NOTE — PROGRESS NOTES
Pt brought in from [facility] for hypoxia. EMS crew states her sats were in the 70s when they arrived. Pt was on cpap when she arrived to our ER. Pt removed from cpap and initial pulse ox was 77% on room air. Pt expresses that she does not feel short of breath at this time although she appears slightly labored. Placed on 5lpm nasal cannula and sats improved to 88%. Placed patient on HFNC for hypoxia and labored respirations 35 lpm/55% blended current spo2 94%. Keep sats greater than 88% per Dr. Fela Elmore. Pt expresses that she feels comfortable with the HFNC at this time.

## 2020-12-04 NOTE — CONSULTS
by mouth daily   Yes Historical Provider, MD   FULVESTRANT IM Inject 500 mg into the muscle every 30 days On the 16th of the month   Yes Historical Provider, MD   latanoprost (XALATAN) 0.005 % ophthalmic solution Place 1 drop into both eyes nightly   Yes Historical Provider, MD   megestrol (MEGACE) 40 MG tablet Take 40 mg by mouth 2 times daily   Yes Historical Provider, MD   metFORMIN (GLUCOPHAGE) 500 MG tablet Take 500 mg by mouth 2 times daily (with meals)   Yes Historical Provider, MD   omeprazole (PRILOSEC) 20 MG delayed release capsule Take 20 mg by mouth daily   Yes Historical Provider, MD   oxyCODONE-acetaminophen (PERCOCET) 5-325 MG per tablet Take 1 tablet by mouth every 6 hours as needed for Pain. Yes Historical Provider, MD   triamcinolone (KENALOG) 0.025 % cream Apply topically 2 times daily as needed Apply Topically   Yes Historical Provider, MD       Current Medications: Scheduled Meds:   ipratropium-albuterol  1 ampule Inhalation Q4H WA    [START ON 12/5/2020] carvedilol  3.125 mg Oral BID WC    [START ON 12/5/2020] azithromycin  500 mg Intravenous Q24H    And    [START ON 12/5/2020] cefTRIAXone (ROCEPHIN) IV  1 g Intravenous Q24H    sodium chloride flush  10 mL Intravenous 2 times per day    aspirin  81 mg Oral Daily    dilTIAZem  360 mg Oral Daily    latanoprost  1 drop Both Eyes Nightly    megestrol  40 mg Oral BID    pantoprazole  40 mg Oral QAM AC    sodium chloride flush  10 mL Intravenous Once    methylPREDNISolone  60 mg Intravenous Q6H    acetaZOLAMIDE  250 mg Intravenous Q12H     Continuous Infusions:   heparin (PORCINE) Infusion 16 Units/kg/hr (12/04/20 1703)     PRN Meds:.acetaminophen **OR** acetaminophen, promethazine **OR** ondansetron, albuterol, sodium chloride flush, polyethylene glycol, oxyCODONE-acetaminophen, heparin (porcine), heparin (porcine)     Allergies:  Ciprofloxacin    Social History:   reports that she has never smoked.  She has never used smokeless tobacco. She reports previous alcohol use. She reports that she does not use drugs. Family History:   Review of Systems   CONSTITUTIONAL:  positive for  fatigue and malaise    EYES:  negative for discharge    HEENT:  negative for epistaxis and sore throat    RESPIRATORY:  \positive for , shortness of breath, COPD sleep apnea on BiPAP,   CARDIOVASCULAR:  negative for chest pain, palpitations, fall positive, edema    GASTROINTESTINAL:  negative for nausea, vomiting, diarrhea, constipation, abdominal pain    GENITOURINARY:  negative for incontinence    MUSCULOSKELETAL:  negative for neck or back pain    NEUROLOGICAL:  negative for headaches, seizures and double vision   PSYCHIATRIC:  negative               PHYSICAL EXAM:    Blood pressure (!) 108/96, pulse 107, temperature 98.4 °F (36.9 °C), temperature source Axillary, resp. rate 18, height 5' 7.5\" (1.715 m), weight 266 lb 9.6 oz (120.9 kg), SpO2 96 %. CONSTITUTIONAL: AOx4, no apparent distress, appears stated age   HEAD: normocephalic, atraumatic   EYES: PERRLA, EOMI   ENT: moist mucous membranes, uvula midline   NECK:  symmetric, no midline tenderness to palpation   LUNGS: Reduced few rhonchi to auscultation bilaterally   CARDIOVASCULAR: regular rate and rhythm, early diastolic murmur systolic murmur murmurs, rubs or gallops   ABDOMEN: Soft, non-tender, non-distended with normal active bowel sounds   SKIN: no rash       DATA:    ECG:AFIB   Echo:12/4/20  Summary  Left ventricle is dilated. Global left ventricular systolic function is  moderate to severely reduced Estimated ejection fraction is 30-35 %. Mild left ventricular hypertrophy. Leftward compression of inter-ventricular septum (\"D-sign\") indicating RV  pressure overload. False tendon seen the apex of the heart. Left atrium is severely dilated. Inter-atrial septum appears so be intact. Right atrium is severely dilated . Severely dilated right ventricular cavity.   Reduced right ventricular function. Aortic leaflet calcification with mild stenosis. Moderate aortic insufficiency. Aortic valve is trileaflet. Normal mitral valve structure and function. Mitral annular calcification is seen. Mild-moderate mitral regurgitation. No mitral stenosis. Normal tricuspid valve structure and function. Mild to moderate tricuspid regurgitation. Estimated right ventricular systolic pressure is 49.60 mmHg. Mild pulmonary hypertension, May be underestimated due to direction of  regurgitant jet. The pulmonic valve is normal in structure. Mild pulmonic insufficiency. No evidence of pulmonic stenosis. Trivial pericardial effusion. No signs of cardiac tamponade. Left pleural effusion. Aortic root is mildly dilated and ascending aorta is moderately dilated  measuring 4.6 cm  IVC Increased diameter and impaired or no inspiratory variation indicating  elevated RA filling pressure (i.e. CVP) . E/E' average = 11.75.       Stress:  Cath:    Labs:     CBC:   Recent Labs     12/03/20 2212 12/04/20  0914   WBC 9.8 7.5   HGB 12.3 12.3   HCT 38.2 37.8    148     BMP:   Recent Labs     12/03/20 2212 12/04/20  0914    141   K 5.0 4.6   CO2 35* 36*   BUN 52* 54*   CREATININE 2.40* 2.07*   LABGLOM 20* 24*   GLUCOSE 147* 128*     BNP: No results for input(s): BNP in the last 72 hours. PT/INR: No results for input(s): PROTIME, INR in the last 72 hours. APTT:  Recent Labs     12/04/20  1559   APTT 29.4     CARDIAC ENZYMES:No results for input(s): CKTOTAL, CKMB, CKMBINDEX, TROPONINI in the last 72 hours. FASTING LIPID PANEL:No results found for: HDL, LDLDIRECT, LDLCALC, TRIG  LIVER PROFILE:No results for input(s): AST, ALT, LABALBU in the last 72 hours.     Intake/Output Summary (Last 24 hours) at 12/4/2020 1732  Last data filed at 12/4/2020 1135  Gross per 24 hour   Intake --   Output 375 ml   Net -375 ml       IMPRESSION:    Patient Active Problem List   Diagnosis    Hypoxia    Chronic obstructive pulmonary disease with acute exacerbation (HCC)    Hypertension    Malignant neoplasm of bone (HCC)    Neuropathy    Paroxysmal atrial fibrillation (HCC)    Atypical atrial flutter (HCC)    Sleep apnea    CKD (chronic kidney disease)    Type 2 diabetes mellitus, without long-term current use of insulin (HCC)    Combined systolic and diastolic congestive heart failure (HCC)    Pneumonia due to infectious organism    Acute on chronic respiratory failure (HCC)           RECOMMENDATIONS:  Biventricular failure, secondary to cor pulmonale, valvular heart disease, A. Fib, CKD  Agree at this time with 2 doses of Diamox, will continue Lasix afterward  Low LVEF on Cardizem will DC at this time, plan to start on Coreg once the blood pressure is improved  Elevated Trope most likely is type II secondary to hypoxia , COPD  History of fall multifactorial, postural hypotension, high ventricular rate on top of A. fib losing cardiac output, vasovaga  Please check orthostatic once patient is able to  A. fib, seems to be persistent already on Eliquis, will restart tonight and DC heparin  History of DVT, VQ scan intermediate PE patient is already on Eliquis pulmonary on board  Patient need ischemia work-up once patient improve from the current respiratory problem  As patient is regular patient of Dr. Bertrand Palacios she should follow up with him after discharge      Discussed with patient and nursing.     Chantelle Orozco, Flavio Hernandez [id number]        [phone]

## 2020-12-04 NOTE — CARE COORDINATION
Medical records request from [facility] made to be faxed to [phone].

## 2020-12-05 ENCOUNTER — APPOINTMENT (OUTPATIENT)
Dept: GENERAL RADIOLOGY | Age: 70
DRG: 291 | End: 2020-12-05
Payer: MEDICARE

## 2020-12-05 LAB
ANION GAP SERPL CALCULATED.3IONS-SCNC: 9 MMOL/L (ref 9–17)
BNP INTERPRETATION: ABNORMAL
BUN BLDV-MCNC: 44 MG/DL (ref 8–23)
BUN/CREAT BLD: ABNORMAL (ref 9–20)
CALCIUM SERPL-MCNC: 9.6 MG/DL (ref 8.6–10.4)
CHLORIDE BLD-SCNC: 95 MMOL/L (ref 98–107)
CHOLESTEROL/HDL RATIO: 2.7
CHOLESTEROL: 80 MG/DL
CO2: 37 MMOL/L (ref 20–31)
CREAT SERPL-MCNC: 1.34 MG/DL (ref 0.5–0.9)
CULTURE: NORMAL
DIRECT EXAM: NORMAL
DIRECT EXAM: NORMAL
GFR AFRICAN AMERICAN: 48 ML/MIN
GFR NON-AFRICAN AMERICAN: 39 ML/MIN
GFR SERPL CREATININE-BSD FRML MDRD: ABNORMAL ML/MIN/{1.73_M2}
GFR SERPL CREATININE-BSD FRML MDRD: ABNORMAL ML/MIN/{1.73_M2}
GLUCOSE BLD-MCNC: 205 MG/DL (ref 65–105)
GLUCOSE BLD-MCNC: 210 MG/DL (ref 65–105)
GLUCOSE BLD-MCNC: 238 MG/DL (ref 65–105)
GLUCOSE BLD-MCNC: 243 MG/DL (ref 70–99)
GLUCOSE BLD-MCNC: 352 MG/DL (ref 65–105)
HCT VFR BLD CALC: 37.4 % (ref 36–46)
HDLC SERPL-MCNC: 30 MG/DL
HEMOGLOBIN: 11.8 G/DL (ref 12–16)
LDL CHOLESTEROL: 36 MG/DL (ref 0–130)
Lab: NORMAL
Lab: NORMAL
MAGNESIUM: 1.5 MG/DL (ref 1.6–2.6)
MAGNESIUM: 1.6 MG/DL (ref 1.6–2.6)
MCH RBC QN AUTO: 30.6 PG (ref 26–34)
MCHC RBC AUTO-ENTMCNC: 31.7 G/DL (ref 31–37)
MCV RBC AUTO: 96.4 FL (ref 80–100)
NRBC AUTOMATED: ABNORMAL PER 100 WBC
PDW BLD-RTO: 15.8 % (ref 12.5–15.4)
PLATELET # BLD: 133 K/UL (ref 140–450)
PMV BLD AUTO: 10 FL (ref 6–12)
POTASSIUM SERPL-SCNC: 3.4 MMOL/L (ref 3.7–5.3)
POTASSIUM SERPL-SCNC: 3.6 MMOL/L (ref 3.7–5.3)
PRO-BNP: ABNORMAL PG/ML
RBC # BLD: 3.87 M/UL (ref 4–5.2)
SODIUM BLD-SCNC: 141 MMOL/L (ref 135–144)
SPECIMEN DESCRIPTION: NORMAL
SPECIMEN DESCRIPTION: NORMAL
TRIGL SERPL-MCNC: 68 MG/DL
TSH SERPL DL<=0.05 MIU/L-ACNC: 0.14 MIU/L (ref 0.3–5)
VLDLC SERPL CALC-MCNC: ABNORMAL MG/DL (ref 1–30)
WBC # BLD: 4.6 K/UL (ref 3.5–11)

## 2020-12-05 PROCEDURE — 2580000003 HC RX 258: Performed by: NURSE PRACTITIONER

## 2020-12-05 PROCEDURE — 97530 THERAPEUTIC ACTIVITIES: CPT

## 2020-12-05 PROCEDURE — 94640 AIRWAY INHALATION TREATMENT: CPT

## 2020-12-05 PROCEDURE — 97535 SELF CARE MNGMENT TRAINING: CPT

## 2020-12-05 PROCEDURE — 6370000000 HC RX 637 (ALT 250 FOR IP): Performed by: INTERNAL MEDICINE

## 2020-12-05 PROCEDURE — 84443 ASSAY THYROID STIM HORMONE: CPT

## 2020-12-05 PROCEDURE — 83735 ASSAY OF MAGNESIUM: CPT

## 2020-12-05 PROCEDURE — 99233 SBSQ HOSP IP/OBS HIGH 50: CPT | Performed by: INTERNAL MEDICINE

## 2020-12-05 PROCEDURE — 84132 ASSAY OF SERUM POTASSIUM: CPT

## 2020-12-05 PROCEDURE — 82947 ASSAY GLUCOSE BLOOD QUANT: CPT

## 2020-12-05 PROCEDURE — 6370000000 HC RX 637 (ALT 250 FOR IP): Performed by: NURSE PRACTITIONER

## 2020-12-05 PROCEDURE — 6360000002 HC RX W HCPCS: Performed by: NURSE PRACTITIONER

## 2020-12-05 PROCEDURE — 71045 X-RAY EXAM CHEST 1 VIEW: CPT

## 2020-12-05 PROCEDURE — 80061 LIPID PANEL: CPT

## 2020-12-05 PROCEDURE — 36415 COLL VENOUS BLD VENIPUNCTURE: CPT

## 2020-12-05 PROCEDURE — 97166 OT EVAL MOD COMPLEX 45 MIN: CPT

## 2020-12-05 PROCEDURE — APPSS45 APP SPLIT SHARED TIME 31-45 MINUTES: Performed by: NURSE PRACTITIONER

## 2020-12-05 PROCEDURE — 94761 N-INVAS EAR/PLS OXIMETRY MLT: CPT

## 2020-12-05 PROCEDURE — 85027 COMPLETE CBC AUTOMATED: CPT

## 2020-12-05 PROCEDURE — 2700000000 HC OXYGEN THERAPY PER DAY

## 2020-12-05 PROCEDURE — 80048 BASIC METABOLIC PNL TOTAL CA: CPT

## 2020-12-05 PROCEDURE — 97162 PT EVAL MOD COMPLEX 30 MIN: CPT

## 2020-12-05 PROCEDURE — 6360000002 HC RX W HCPCS: Performed by: INTERNAL MEDICINE

## 2020-12-05 PROCEDURE — 2000000000 HC ICU R&B

## 2020-12-05 PROCEDURE — 83880 ASSAY OF NATRIURETIC PEPTIDE: CPT

## 2020-12-05 PROCEDURE — 94660 CPAP INITIATION&MGMT: CPT

## 2020-12-05 RX ORDER — POTASSIUM CHLORIDE 20 MEQ/1
40 TABLET, EXTENDED RELEASE ORAL PRN
Status: DISCONTINUED | OUTPATIENT
Start: 2020-12-05 | End: 2020-12-08 | Stop reason: HOSPADM

## 2020-12-05 RX ORDER — MAGNESIUM SULFATE 1 G/100ML
1 INJECTION INTRAVENOUS PRN
Status: DISCONTINUED | OUTPATIENT
Start: 2020-12-05 | End: 2020-12-08 | Stop reason: HOSPADM

## 2020-12-05 RX ORDER — METHYLPREDNISOLONE SODIUM SUCCINATE 40 MG/ML
40 INJECTION, POWDER, LYOPHILIZED, FOR SOLUTION INTRAMUSCULAR; INTRAVENOUS DAILY
Status: DISCONTINUED | OUTPATIENT
Start: 2020-12-06 | End: 2020-12-07

## 2020-12-05 RX ORDER — POTASSIUM CHLORIDE 7.45 MG/ML
10 INJECTION INTRAVENOUS PRN
Status: DISCONTINUED | OUTPATIENT
Start: 2020-12-05 | End: 2020-12-08 | Stop reason: HOSPADM

## 2020-12-05 RX ADMIN — IPRATROPIUM BROMIDE AND ALBUTEROL SULFATE 1 AMPULE: .5; 3 SOLUTION RESPIRATORY (INHALATION) at 08:24

## 2020-12-05 RX ADMIN — MEGESTROL ACETATE 40 MG: 40 SUSPENSION ORAL at 09:17

## 2020-12-05 RX ADMIN — IPRATROPIUM BROMIDE AND ALBUTEROL SULFATE 1 AMPULE: .5; 3 SOLUTION RESPIRATORY (INHALATION) at 20:11

## 2020-12-05 RX ADMIN — SODIUM CHLORIDE, PRESERVATIVE FREE 10 ML: 5 INJECTION INTRAVENOUS at 09:13

## 2020-12-05 RX ADMIN — METHYLPREDNISOLONE SODIUM SUCCINATE 40 MG: 40 INJECTION, POWDER, FOR SOLUTION INTRAMUSCULAR; INTRAVENOUS at 01:08

## 2020-12-05 RX ADMIN — MAGNESIUM SULFATE HEPTAHYDRATE 1 G: 1 INJECTION, SOLUTION INTRAVENOUS at 23:42

## 2020-12-05 RX ADMIN — INSULIN LISPRO 10 UNITS: 100 INJECTION, SOLUTION INTRAVENOUS; SUBCUTANEOUS at 12:28

## 2020-12-05 RX ADMIN — FUROSEMIDE 40 MG: 10 INJECTION, SOLUTION INTRAMUSCULAR; INTRAVENOUS at 17:54

## 2020-12-05 RX ADMIN — INSULIN LISPRO 4 UNITS: 100 INJECTION, SOLUTION INTRAVENOUS; SUBCUTANEOUS at 17:54

## 2020-12-05 RX ADMIN — CARVEDILOL 3.12 MG: 3.12 TABLET, FILM COATED ORAL at 09:16

## 2020-12-05 RX ADMIN — INSULIN LISPRO 2 UNITS: 100 INJECTION, SOLUTION INTRAVENOUS; SUBCUTANEOUS at 21:04

## 2020-12-05 RX ADMIN — SODIUM CHLORIDE, PRESERVATIVE FREE 10 ML: 5 INJECTION INTRAVENOUS at 21:13

## 2020-12-05 RX ADMIN — IPRATROPIUM BROMIDE AND ALBUTEROL SULFATE 1 AMPULE: .5; 3 SOLUTION RESPIRATORY (INHALATION) at 15:46

## 2020-12-05 RX ADMIN — POTASSIUM CHLORIDE 40 MEQ: 1500 TABLET, EXTENDED RELEASE ORAL at 21:03

## 2020-12-05 RX ADMIN — LATANOPROST 1 DROP: 50 SOLUTION OPHTHALMIC at 21:15

## 2020-12-05 RX ADMIN — OXYCODONE HYDROCHLORIDE AND ACETAMINOPHEN 1 TABLET: 5; 325 TABLET ORAL at 09:00

## 2020-12-05 RX ADMIN — ASPIRIN 81 MG: 81 TABLET, COATED ORAL at 09:17

## 2020-12-05 RX ADMIN — FUROSEMIDE 40 MG: 10 INJECTION, SOLUTION INTRAMUSCULAR; INTRAVENOUS at 05:15

## 2020-12-05 RX ADMIN — METHYLPREDNISOLONE SODIUM SUCCINATE 40 MG: 40 INJECTION, POWDER, FOR SOLUTION INTRAMUSCULAR; INTRAVENOUS at 17:54

## 2020-12-05 RX ADMIN — APIXABAN 5 MG: 5 TABLET, FILM COATED ORAL at 09:00

## 2020-12-05 RX ADMIN — MAGNESIUM SULFATE HEPTAHYDRATE 2 G: 1 INJECTION, SOLUTION INTRAVENOUS at 21:03

## 2020-12-05 RX ADMIN — PANTOPRAZOLE SODIUM 40 MG: 40 TABLET, DELAYED RELEASE ORAL at 09:01

## 2020-12-05 RX ADMIN — IPRATROPIUM BROMIDE AND ALBUTEROL SULFATE 1 AMPULE: .5; 3 SOLUTION RESPIRATORY (INHALATION) at 12:07

## 2020-12-05 RX ADMIN — INSULIN LISPRO 2 UNITS: 100 INJECTION, SOLUTION INTRAVENOUS; SUBCUTANEOUS at 08:59

## 2020-12-05 RX ADMIN — METHYLPREDNISOLONE SODIUM SUCCINATE 40 MG: 40 INJECTION, POWDER, FOR SOLUTION INTRAMUSCULAR; INTRAVENOUS at 08:59

## 2020-12-05 RX ADMIN — APIXABAN 5 MG: 5 TABLET, FILM COATED ORAL at 21:03

## 2020-12-05 RX ADMIN — MEGESTROL ACETATE 40 MG: 40 SUSPENSION ORAL at 21:09

## 2020-12-05 ASSESSMENT — PAIN DESCRIPTION - PROGRESSION

## 2020-12-05 ASSESSMENT — ENCOUNTER SYMPTOMS
COUGH: 1
SORE THROAT: 0
SHORTNESS OF BREATH: 1
NAUSEA: 0
SHORTNESS OF BREATH: 0
WHEEZING: 0
ABDOMINAL PAIN: 0
TROUBLE SWALLOWING: 0
CHEST TIGHTNESS: 0
CONSTIPATION: 0
VOMITING: 0
DIARRHEA: 0
BLOOD IN STOOL: 0

## 2020-12-05 ASSESSMENT — PAIN SCALES - GENERAL
PAINLEVEL_OUTOF10: 0
PAINLEVEL_OUTOF10: 3
PAINLEVEL_OUTOF10: 0
PAINLEVEL_OUTOF10: 6
PAINLEVEL_OUTOF10: 0

## 2020-12-05 NOTE — PROGRESS NOTES
Patient not tolerating nick wick. Patient has BNP of 28,000 with need for accurate I/O on critical patient. Order for gomez cath received by [facility] NP. Gomez placed. Patient tolerated well. Will continue to monitor.

## 2020-12-05 NOTE — PROGRESS NOTES
Gastrointestinal: Negative for abdominal pain, constipation, diarrhea, nausea and vomiting. Genitourinary: Negative for difficulty urinating, dysuria and frequency. Musculoskeletal: Negative for arthralgias and joint swelling. Skin: Negative for rash. Allergic/Immunologic: Negative for immunocompromised state. Neurological: Positive for weakness. Negative for dizziness, speech difficulty and headaches. Hematological: Negative for adenopathy. Psychiatric/Behavioral: Negative for behavioral problems and sleep disturbance.      OBJECTIVE     VITAL SIGNS:   LAST-  /66   Pulse 98   Temp 97.9 °F (36.6 °C) (Oral)   Resp 18   Ht 5' 7.5\" (1.715 m)   Wt 264 lb 4.8 oz (119.9 kg)   SpO2 94%   BMI 40.78 kg/m²   8-24 HR RANGE-  TEMP Temp  Av.9 °F (36.6 °C)  Min: 97.3 °F (36.3 °C)  Max: 98.4 °F (29.1 °C)   BP Systolic (16AKU), JBX:903 , Min:104 , FUX:322      Diastolic (18YCH), API:76, Min:56, Max:77     PULSE Pulse  Av.8  Min: 77  Max: 107   RR Resp  Av  Min: 16  Max: 18   O2 SAT SpO2  Av %  Min: 93 %  Max: 95 %   OXYGEN DELIVERY O2 Flow Rate (L/min)  Av.4 L/min  Min: 2 L/min  Max: 3 L/min     Systemic Examination:   General appearance - looks comfortable and in no acute distress  Mental status - alert, oriented to person, place, and time  Eyes - pupils equal and reactive, extraocular eye movements intact  Mouth - mucous membranes moist, pharynx normal without lesions  Neck - supple, no significant adenopathy  Chest -breath sounds diminished bilaterally with bibasilar crackles  Heart - normal rate, regular rhythm, normal S1, S2, no murmurs, rubs, clicks or gallops  Abdomen - soft, nontender, nondistended, no masses or organomegaly  Neurological - alert, oriented, normal speech, no focal findings or movement disorder noted  Extremities - peripheral pulses normal, 1+ pedal edema, no clubbing or cyanosis  Skin - normal coloration and turgor, no rashes, no suspicious skin lesions noted     DATA REVIEW     Medications:  Scheduled Meds:   insulin lispro  0-12 Units Subcutaneous TID     insulin lispro  0-6 Units Subcutaneous Nightly    ipratropium-albuterol  1 ampule Inhalation Q4H WA    [Held by provider] carvedilol  3.125 mg Oral BID     sodium chloride flush  10 mL Intravenous 2 times per day    aspirin  81 mg Oral Daily    [Held by provider] dilTIAZem  360 mg Oral Daily    latanoprost  1 drop Both Eyes Nightly    megestrol  40 mg Oral BID    pantoprazole  40 mg Oral QAM AC    sodium chloride flush  10 mL Intravenous Once    methylPREDNISolone  40 mg Intravenous Q8H    apixaban  5 mg Oral BID    furosemide  40 mg Intravenous Q12H     Continuous Infusions:   dextrose       LABS:-  ABG:   Recent Labs     12/04/20  1102   POCPH 7.38   POCPCO2 64*   POCPO2 89   POCHCO3 38.5*   YLUH9CHD 96     CBC:   Recent Labs     12/03/20 2212 12/04/20  0914 12/05/20  0537   WBC 9.8 7.5 4.6   HGB 12.3 12.3 11.8*   HCT 38.2 37.8 37.4   MCV 96.8 96.3 96.4    148 133*   LYMPHOPCT 8*  --   --    RBC 3.94* 3.93* 3.87*   MCH 31.3 31.3 30.6   MCHC 32.4 32.5 31.7   RDW 16.5* 16.6* 15.8*     BMP:   Recent Labs     12/03/20 2212 12/04/20  0914 12/05/20  0537    141 141   K 5.0 4.6 3.6*   CL 94* 99 95*   CO2 35* 36* 37*   BUN 52* 54* 44*   CREATININE 2.40* 2.07* 1.34*   GLUCOSE 147* 128* 243*     Liver Function Test:   No results for input(s): PROT, LABALBU, ALT, AST, GGT, ALKPHOS, BILITOT in the last 72 hours. Amylase/Lipase:  No results for input(s): AMYLASE, LIPASE in the last 72 hours. Coagulation Profile:   Recent Labs     12/04/20  1559   APTT 29.4     Cardiac Enzymes:  No results for input(s): CKTOTAL, CKMB, CKMBINDEX, TROPONINI in the last 72 hours.   Lactic Acid:  Lab Results   Component Value Date    LACTA 2.0 12/04/2020    LACTA 2.4 (H) 12/03/2020     BNP:   No results found for: BNP  D-Dimer:  Lab Results   Component Value Date    DDIMER 6.55 12/03/2020     Others:   Lab Results   Component Value Date    TSH 0.14 (L) 12/05/2020     No results found for: CHEN, RHEUMFACTOR, SEDRATE, CRP  No results found for: Willim Pillion  No results found for: IRON, TIBC, FERRITIN  No results found for: SPEP, UPEP  No results found for: PSA, CEA, , OS1478,     Input/Output:    Intake/Output Summary (Last 24 hours) at 12/5/2020 1753  Last data filed at 12/5/2020 1708  Gross per 24 hour   Intake 802.99 ml   Output 4450 ml   Net -3647.01 ml       Microbiology:  Recent Labs     12/04/20  1230   SPECDESC . FACE SWAB   SPECIAL NOT REPORTED   CULTURE CULTURE IN PROGRESS       Pathology:    Radiology reports:  XR CHEST PORTABLE   Final Result   No significant change in chest findings compared to the prior study. CHF   findings persist.         NM LUNG SCAN PERFUSION ONLY   Final Result   Indeterminate for pulmonary embolism given presence of effusion and 2   moderate size perfusion defects. This does not meet criteria for high   probability for pulmonary embolism. XR CHEST PORTABLE   Final Result   Cardiomegaly with perihilar congestion. Bilateral effusions and airspace opacities favor atelectasis. NM LUNG VENT/PERFUSION (VQ)    (Results Pending)   US DUP LOWER EXTREMITY RIGHT NELIDA    (Results Pending)   US DUP LOWER EXTREMITY LEFT NELIDA    (Results Pending)   XR CHEST PORTABLE    (Results Pending)       Echocardiogram:   Results for orders placed during the hospital encounter of 12/03/20   ECHO Complete 2D W Doppler W Color    Narrative   Summary  Left ventricle is dilated. Global left ventricular systolic function is  moderate to severely reduced Estimated ejection fraction is 30-35 %. Mild left ventricular hypertrophy. Leftward compression of inter-ventricular septum (\"D-sign\") indicating RV  pressure overload. False tendon seen the apex of the heart. Left atrium is severely dilated. Inter-atrial septum appears so be intact.   Right atrium is severely dilated .  Severely dilated right ventricular cavity. Reduced right ventricular function. Aortic leaflet calcification with mild stenosis. Moderate aortic insufficiency. Aortic valve is trileaflet. Normal mitral valve structure and function. Mitral annular calcification is seen. Mild-moderate mitral regurgitation. No mitral stenosis. Normal tricuspid valve structure and function. Mild to moderate tricuspid regurgitation. Estimated right ventricular systolic pressure is 92.11 mmHg. Mild pulmonary hypertension, May be underestimated due to direction of  regurgitant jet. The pulmonic valve is normal in structure. Mild pulmonic insufficiency. No evidence of pulmonic stenosis. Trivial pericardial effusion. No signs of cardiac tamponade. Left pleural effusion. Aortic root is mildly dilated and ascending aorta is moderately dilated  measuring 4.6 cm  IVC Increased diameter and impaired or no inspiratory variation indicating  elevated RA filling pressure (i.e. CVP) . E/E' average = 11.75. Cardiac Catheterization:   No results found for this or any previous visit.     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  // Chronic obstructive pulmonary disease  // Paroxysmal atrial fibrillation  // Chronic kidney disease  // Pulmonary hypertension  // Obstructive sleep apnea  // Morbid obesity  // Type 2 diabetes mellitus  // Benign essential hypertension    Plan:     Patient remains hemodynamically stable and is currently saturating well on nasal cannula    Continue supplemental oxygen to keep oxygen saturation greater than 92%   Continue nocturnal and as needed noninvasive mechanical ventilation (BiPAP)   Continue Eliquis   Encourage incentive spirometry   Continue pulmonary toilet, aspiration precautions and bronchodilators   Continue diuresis   Continue to monitor electrolytes, I/O with a goal of even/negative fluid balance   Antimicrobials reviewed; currently being monitored off antimicrobials   Will recommend decreasing the dose of Solu-Medrol to 40 mg daily   Patient was counseled about smoking cessation   DVT and stress ulcer prophylaxis   Physical/occupational therapy; increase activity as tolerated    It was my pleasure to evaluate Syeda Philippe today. We will continue to follow. I would like to thank you for allowing me to participate in the care of this patient. Please feel free to call with any further questions or concerns. Uyen Posada M.D. Pulmonary and Critical Care Medicine           12/5/2020, 5:53 PM    Please note that this chart was generated using voice recognition Dragon dictation software. Although every effort was made to ensure the accuracy of this automated transcription, some errors in transcription may have occurred.

## 2020-12-05 NOTE — PROGRESS NOTES
Occupational Therapy   Occupational Therapy Initial Assessment  Date: 2020   Patient Name: Sivakumar Burch  MRN: [id number]     : 1950    Date of Service: 2020    Discharge Recommendations:  Patient would benefit from continued therapy after discharge  OT Equipment Recommendations  Equipment Needed: (CTA)    Assessment   Performance deficits / Impairments: Decreased functional mobility ; Decreased safe awareness;Decreased balance;Decreased ADL status; Decreased endurance;Decreased strength;Decreased sensation  Assessment: Pt completed initial OT evaluation this date, limited by above noted deficits. Pt recommended to continue acute OT to maximize safety and independence with ADLs and functional transfers/mobility. Pt recommended to continue therapy upon discharge. Pt expected to require 24/7 assist upon discharge due to requiring Max A - Max A x2-3 people for all functional tasks this date. Prognosis: Fair  Decision Making: Medium Complexity  OT Education: OT Role;Transfer Training;Equipment;Plan of Care;Energy Conservation; ADL Adaptive Strategies  Patient Education: purpose of eval, safety awareness - good return  REQUIRES OT FOLLOW UP: Yes  Activity Tolerance  Activity Tolerance: Patient limited by pain(to L LE)  Safety Devices  Safety Devices in place: Yes  Type of devices: Gait belt;Patient at risk for falls;Call light within reach; Left in chair;Chair alarm in place;Nurse notified  Restraints  Initially in place: No         Patient Diagnosis(es): The primary encounter diagnosis was Pneumonia due to organism. Diagnoses of Congestive heart failure, unspecified HF chronicity, unspecified heart failure type ([facility] [address] Hypoxia, and Elevated troponin were also pertinent to this visit. has a past medical history of CHF (congestive heart failure) ([facility] [address] CKD (chronic kidney disease), COPD (chronic obstructive pulmonary disease) ([facility] [address] Diabetes mellitus ([facility] [address] Hyperlipidemia, Hypertension, and Vertigo. driving for her before pt's cataracts surgery occurred)  Leisure & Hobbies: keaton, playing on the computer     Objective   Vision: Impaired  Vision Exceptions: Wears glasses for reading;Cataracts(recent cataracts surgery to R eye, was supposed to get surgery on L eye prior to hospitalization)  Hearing: Within functional limits    Orientation  Overall Orientation Status: Within Functional Limits     Balance  Sitting Balance: Contact guard assistance(seated EOB prior to transfer ~10 min)  Standing Balance: Maximum assistance(x2 person assist for standing in Rosa Thornfield ~5 seconds and ~15 seconds)  Functional Mobility  Functional Mobility Comments: Not assessed this date. Pt required Max X2 for sit<>stand transfers with Rosa Thornfield with 3rd person present for safety. ADL  Feeding: Modified independent   Grooming: Setup; Increased time to complete;Stand by assistance  UE Bathing: Verbal cueing; Increased time to complete; Moderate assistance;Setup  LE Bathing: Maximum assistance; Increased time to complete;Verbal cueing;Setup  UE Dressing: Moderate assistance; Increased time to complete;Verbal cueing  LE Dressing: Maximum assistance; Increased time to complete;Verbal cueing  Toileting: Maximum assistance; Increased time to complete;Setup  Tone RUE  RUE Tone: Normotonic  Tone LUE  LUE Tone: Normotonic  Coordination  Movements Are Fluid And Coordinated: Yes     Bed mobility  Supine to Sit: Maximum assistance(2nd person present for safety, HOB ~30*)  Sit to Supine: Unable to assess(pt retired to seated in recliner upon exit)  Scooting: Maximal assistance  Comment: Pt required mod VCs for safety awareness, proper hand/foot placement throughout  Transfers  Sit to stand: 2 Person assistance;Maximum assistance  Stand to sit: 2 Person assistance;Maximum assistance  Transfer Comments: 3rd person present for safety during transfer with use of Rosa Thornfield.  Pt required mod VCs for safety awareness, proper hand/foor placement, use of equipment.      Cognition  Overall Cognitive Status: WFL     Sensation  Overall Sensation Status: Impaired  Light Touch: Partial deficits in the RUE;Partial deficits in the LUE(pt reports chronic numbness/tingling to B feet and occasionally in B hands)      LUE AROM (degrees)  LUE AROM : WFL  Left Hand AROM (degrees)  Left Hand AROM: WFL  RUE AROM (degrees)  RUE AROM : WFL  Right Hand AROM (degrees)  Right Hand AROM: WFL  LUE Strength  Gross LUE Strength: Ex[facility]  LUE Strength Comment: 4-/5 grossly  RUE Strength  Gross RUE Strength: Exceptions to [facility]  RUE Strength Comment: 4-/5 grossly     Plan   Plan  Times per week: 5-6x/wk  Current Treatment Recommendations: Strengthening, Endurance Training, Patient/Caregiver Education & Training, Equipment Evaluation, Education, & procurement, Self-Care / ADL, Balance Training, Functional Mobility Training, Safety Education & Training    AM-PAC Score  AM-[facility] Inpatient Daily Activity Raw Score: 15 (12/05/20 1415)  AM-PAC Inpatient ADL T-Scale Score : 34.69 (12/05/20 1415)  ADL Inpatient CMS 0-100% Score: 56.46 (12/05/20 1415)  ADL Inpatient CMS G-Code Modifier : CK (12/05/20 1415)    Goals  Short term goals  Time Frame for Short term goals: 14 visits  Short term goal 1: Pt will demo good safety awareness MOD IND throughout ADLs and functional transfers/mobility  Short term goal 2: Pt will perform functional transfers/mobility with MOD A and use of LRD  Short term goal 3: Pt will perform UB ADLs with SBA and use of AE/DME PRN  Short term goal 4: Pt will perform LB ADLs with MIN A and use of AE/DME PRN  Short term goal 5: Pt will demo 10+ minutes activity tolerance with use of ECWS techniques for increased ADL participation     Therapy Time   Individual Concurrent Group Co-treatment   Time In 1132         Time Out 0390         Minutes 35      Co-eval with PT   Timed Code Treatment Minutes: 8 Minutes     Arnoldo Polanco, OTR/L

## 2020-12-05 NOTE — PROGRESS NOTES
[facility]  Office: [address], DO, Levell Ebbs, DO, Asaf List, DO, Emily Dc Blood, DO, Terrell Hayes MD, Andreina Naik MD, Anh Brice MD, Rufina Talavera MD, Giovany Stokes MD, Candy Power MD, Beckie Ybarra MD, Taz Briones MD, Ernestina Vogel MD, Jeffery Edwards, DO, Tre Dominguez MD, Maddy Mendez MD, Mag Farrell, DO, Fang Myles MD,  Mati Sellers, DO, Melvi Mitchell MD, Darius Paez MD, Lety Todd, SREEDHAR, [facility] campus[facility]IM Blanchard, CNP, Beverly Miller, CNP, Jh Godfrey, CNS, Patel Modi, CNP, Yimi Goldberg, CNP, Ana Napier, CNP, Manish Rockwell, CNP, Maggy Bowers, CNP, Carlton Mensah PA-C, Teena Contreras, [facility], Parker Agarwal, CNP, Galen Kessler, CNP, Zoey Day, CNP, Farideh Kelly, CNP, Jose Chan, [id number]    Progress Note    12/5/2020    2:04 PM    Name:   Shaggy Romero  MRN:     [id number]     Acct:      [de-identified]   Room:   [address] [place] [address] Day:  1  Admit Date:  12/3/2020 10:16 PM    PCP:   Carlos Mcintosh MD  Code Status:  Full Code    Subjective:     C/C:   Chief Complaint   Patient presents with    Shortness of Breath     70s per ems from nursing home     Interval History Status: improved. She states she feels much better today. She states she feels fine but she still isn't the best historian. She can answer orientation questions. She was up to the chair with therapy and did well with asst. Lift required to get back to bed. Weaning oxygen as tolerated. Brief History:     Per previous documentation    Shaggy Romero is a 78 y.o. Non-/non  female who presents with Shortness of Breath (70s per ems from nursing home)   and is admitted to the hospital for the management of Hypoxia.     The patient was brought to the ER per EMS related to hypoxia. The patient states she was just discharged to a facilty a hospitalization in [facility].  She presented there because of pain in her left foot, frequent falls, and weakness. She was found to be in A fib with RVR. A chest xray showed right pleural effusion and atelectasis or infiltrate. BNP during that visit less than 900. She was discharged on bumex. According the ER note the patients sats were in the 70's at the facility. She denies feeling particularly sob. She denies fever, chills, N/V. No chest pain or palpitations. She does have chronic wounds to her chin and buttocks that she follows with wound clinic. All of her doctors are from [facility] including Dr Jessica Abraham, Dr Trenton Landry? and Dr Zaid Smith. She does continue to have monthly injections per oncology for bone mets. She states she wears 2 l nc through her bipap. The patient states she is compliant with hr medications.      In the ER she was placed on highflo, started on Rocephin and Zithromax IV, Lovenox at 0.5 mg/kg. CXR showsCardiomegaly with perihilar congestion. Bilateral effusions and airspace opacities favor atelectasis  A ct was not completed r/t creatine of 2.4. a VQ scan scan this morning was Indeterminate for pulmonary embolism. After obtaining her Echo that showed Leftward compression of inter-ventricular septum (\"D-sign\") indicating RV pressure overload. False tendon seen the apex of the heart we started her high intensity heparin protocol. Eliquis on hold for now. Pulmonary consulted. Per Dr Jo Flores to restart eliquis and dc heparin gtt from pulmonary standpoint if no objection by cardiology in light of valvular Afib . Dc antibiotics - not pneumonia. Per Dr. David Pinon LVEF on Cardizem will DC at this time, plan to start on Coreg once the blood pressure is improved. Ok to stop heparin and resume Eliquis. will continue Lasix. Review of Systems:     Review of Systems   Constitutional: Negative for chills, diaphoresis and fever. HENT: Negative for congestion. Eyes: Negative for visual disturbance. Respiratory: Positive for cough.  Negative for chest tightness, shortness of breath and wheezing. Cardiovascular: Negative for chest pain, palpitations and leg swelling. Gastrointestinal: Negative for abdominal pain, blood in stool, constipation, diarrhea, nausea and vomiting. Genitourinary: Negative for difficulty urinating. Skin: Positive for wound. Neurological: Negative for dizziness, weakness, light-headedness, numbness and headaches. All other systems reviewed and are negative. Medications: Allergies: Allergies   Allergen Reactions    Ciprofloxacin        Current Meds:   Scheduled Meds:    insulin lispro  0-12 Units Subcutaneous TID WC    insulin lispro  0-6 Units Subcutaneous Nightly    ipratropium-albuterol  1 ampule Inhalation Q4H WA    [Held by provider] carvedilol  3.125 mg Oral BID WC    sodium chloride flush  10 mL Intravenous 2 times per day    aspirin  81 mg Oral Daily    [Held by provider] dilTIAZem  360 mg Oral Daily    latanoprost  1 drop Both Eyes Nightly    megestrol  40 mg Oral BID    pantoprazole  40 mg Oral QAM AC    sodium chloride flush  10 mL Intravenous Once    methylPREDNISolone  40 mg Intravenous Q8H    apixaban  5 mg Oral BID    furosemide  40 mg Intravenous Q12H     Continuous Infusions:    dextrose       PRN Meds: acetaminophen **OR** acetaminophen, promethazine **OR** ondansetron, albuterol, sodium chloride flush, polyethylene glycol, oxyCODONE-acetaminophen, glucose, dextrose, glucagon (rDNA), dextrose    Data:     Past Medical History:   has a past medical history of CHF (congestive heart failure) ([facility] [address] CKD (chronic kidney disease), COPD (chronic obstructive pulmonary disease) ([facility] [address] [facility] Hyperlipidemia, Hypertension, and Vertigo. Social History:   reports that she has never smoked. She has never used smokeless tobacco. She reports previous alcohol use. She reports that she does not use drugs. Family History: History reviewed.  No pertinent family right and left lower gum line   Neurological:      General: No focal deficit present. Mental Status: She is alert. Sensory: Sensation is intact. Comments: She is oriented to person place and time but cant answer questions about her history. Psychiatric:         Mood and Affect: Mood normal.         Behavior: Behavior is cooperative. Assessment:     Hospital Problems           Last Modified POA    * (Principal) Hypoxia 12/4/2020 Yes    Chronic obstructive pulmonary disease with acute exacerbation ([address] 12/4/2020 Yes    Hypertension 12/4/2020 Yes    Paroxysmal atrial fibrillation ([address] 12/4/2020 Yes    Sleep apnea 12/4/2020 Yes    CKD (chronic kidney disease) 12/4/2020 Yes    Type 2 diabetes mellitus, without long-term current use of insulin ([address] 12/4/2020 Yes    Acute on chronic combined systolic and diastolic congestive heart failure ([address] 12/4/2020 Yes    Pneumonia due to infectious organism 12/4/2020 Yes    Acute on chronic respiratory failure ([facility] [address] 12/4/2020 Yes    Overview Signed 12/4/2020  4:51 PM by EMY Manley - CNP     2 l nc through bipap at night         Pneumonia due to organism 12/4/2020 Yes          Plan:     Acute on chronic respiratory failure with hypoxia; pulmonary medicine consulted. Continue high flow pending their recommendations. Wean high flow - keep SATs 88-92 %   Use BiPAP at night keep SATs 88-92 %   Solu-Medrol IV, respiratory treatments as ordered. Dc antibiotics - not pneumonia      Acute on chronic systolic and diastolic heart failure; echo completed. Cardiology and pulmonary reviewed Echo they believe CHF . Lasix 40 mg IV BID. Cardiology following. BNP every other day. Hold cardizem     LYSSA; patient has a history of CKD; avoid nephrotoxic agents. Monitor BMP     Pneumonia; ruled out     Type 2 diabetes; insulin sliding scale for glucose management.   Hold Glucophage for now related to LYSSA     A. fib; resume Eliquis at this time.      Mechanical DVT

## 2020-12-05 NOTE — PLAN OF CARE
Problem: Skin Integrity:  Goal: Will show no infection signs and symptoms  Description: Will show no infection signs and symptoms  12/5/2020 0050 by Sara Eng RN  Outcome: Ongoing     Problem: Skin Integrity:  Goal: Absence of new skin breakdown  Description: Absence of new skin breakdown  12/5/2020 0050 by Sara Eng RN  Outcome: Ongoing   No new skin breakdown noted, no signs/symptoms of infection, continue to monitor labwork including WBC, medications ordered   Problem: Infection:  Goal: Will remain free from infection  Description: Will remain free from infection  12/5/2020 0050 by Sara Eng RN  Outcome: Ongoing   no signs/symptoms of infection, continue to monitor labwork including WBC, medications administered per orders  roblem: Safety:  Goal: Free from accidental physical injury  Description: Free from accidental physical injury  12/5/2020 0050 by Sara Eng RN  Outcome: Ongoing   No falls/injuries this shift, bed in lowest position, brakes on, bed alarm on, call light in reach, side rails up x2   Problem: Pain:  Goal: Patient's pain/discomfort is manageable  Description: Patient's pain/discomfort is manageable  12/5/2020 0050 by Sara Eng RN  Outcome: Ongoing     Problem: Pain:  Goal: Control of chronic pain  Description: Control of chronic pain  12/5/2020 0050 by Sara Eng RN  Outcome: Ongoing   No signs/symptoms of pain noted, pain rating <3 on scale 0-10, pain controlled with medication/repositioning   Problem: Skin Integrity:  Goal: Skin integrity will stabilize  Description: Skin integrity will stabilize  12/5/2020 0050 by Sara Eng RN  Outcome: Ongoing  No new skin breakdown noted, no signs/symptoms of infection, continue to monitor labwork including WBC, medications ordered   Problem: Discharge Planning:  Goal: Patients continuum of care needs are met  Description: Patients continuum of care needs are met  12/5/2020 0050 by Ronny Mcelroy RN  Outcome: Ongoing

## 2020-12-05 NOTE — PROGRESS NOTES
Patient refusing BIPAP at this time. Will continue to monitor. HR 95 Spo2 94% RR 20. Pt currently on High flow nasal cannula.

## 2020-12-05 NOTE — PLAN OF CARE
Problem: Skin Integrity:  Goal: Will show no infection signs and symptoms  Outcome: Ongoing   Skin assessment preformed. Pt turned every 2 hours with heals elevated off bed. Waffle mattress in place. Will continue to monitor. Problem: Infection:  Goal: Will remain free from infection  Outcome: Ongoing    Problem: Pain:  Goal: Patient's pain/discomfort is manageable  Outcome: Ongoing   Pain scale preformed per protocol and pt treated for pain as documented. Education given.

## 2020-12-05 NOTE — PROGRESS NOTES
bedside commode   Pt with bilateral LE dopplers ordered- pt was on heparin yesterday and transitioned to Eliquis today. Per Iggy Manzo, NP, ok to work with patient without dopplers at this time as pt is on anticoagulation. Vision/Hearing  Vision: Impaired  Vision Exceptions: Wears glasses for reading;Cataracts(recent cataracts surgery to R eye, was supposed to get surgery on L eye prior to hospitalization)  Hearing: Within functional limits     Subjective  General  Patient assessed for rehabilitation services?: Yes  Response To Previous Treatment: Not applicable  Family / Caregiver Present: No  Follows Commands: Within Functional Limits  Subjective  Subjective: Pt supine in bed and agreeable to therapy this morning. RN agreeable to therapy. Pt reports no pain at rest but does note she has pain usually when she moves (states she will keep us informed).   Pain Screening  Patient Currently in Pain: Denies(at rest)  Vital Signs  Patient Currently in Pain: Denies(at rest)       Social/Functional History  Social/Functional History  Lives With: Alone  Type of Home: House  Home Layout: Two level, Performs ADL's on one level(pt reports doesn't access second floor)  Home Access: Ramped entrance  Bathroom Shower/Tub: Walk-in shower  Bathroom Toilet: Handicap height  Bathroom Equipment: Shower chair, Grab bars in [address]: Rolling walker  Receives Help From: Home health, Other (comment)(aide 7 days per week for wound care, hired someone to assist with IADLs)  ADL Assistance: Independent  Homemaking Assistance: Needs assistance(pt reports has hired someone to come in to help with cooking/meal prep/laundry/pet care/grocery shopping)  Homemaking Responsibilities: No  Ambulation Assistance: Independent(pt reports primarily gets around home by wheeling self in [employer]", occasionally uses RW for mobility)  Transfer Assistance: Independent(pt reports completing stand pivot transfers between chairs in home and occasionally using RW for transfers)  Active : Yes(pt reports person who helps with IADLs was driving for her before pt's cataracts surgery occurred)  Leisure & Hobbies: keaton, playing on the computer  Above home information is from her mobility prior to her recent hospitalization to [facility]. She was discharged from there to [address] [place] [address] but was only there for a day or so. Objective          AROM RLE (degrees)  RLE AROM: WFL  AROM LLE (degrees)  LLE AROM : WFL  AROM RUE (degrees)  RUE AROM : WFL  AROM LUE (degrees)  LUE AROM : WFL  Strength RLE  Comment: grossly 4-/5  Strength LLE  Comment: 3/5 demonstrated- no MMT secondary to pain complaints  Strength RUE  Comment: Co evaluation with OT-see OT evaluation for full UE assessment  Strength LUE  Comment: Co evaluation with OT-see OT evaluation for full UE assessment     Sensation  Overall Sensation Status: Impaired  Light Touch: Partial deficits in the RUE;Partial deficits in the LUE(pt reports chronic numbness/tingling to B feet and occasionally in B hands)  Bed mobility  Supine to Sit: Maximum assistance(2nd person for safety, HOB raised)  Sit to Supine: (Pt retired to chair at end of session with lift in place for return to bed)  Scooting: Maximal assistance  Comment: Moderate verbal cues, significant encouragement; Pt able to tolerate 9 minutes seated EOB with mostly min A to maintain.    Transfers  Sit to Stand: Maximum Assistance;2 Person Assistance  Stand to sit: Maximum Assistance;2 Person Assistance  Comment: nikhil belle; able to stand twice- 10 seconds on first attempt and 40 seconds on second attempt  Ambulation  Ambulation?: No  Stairs/Curb  Stairs?: No     Balance  Posture: Fair  Sitting - Static: Fair  Sitting - Dynamic: Fair;-  Standing - Static: Poor  Comments: standing balance assessed in nikhil blele        Plan   Plan  Times per week: 5-6x  Times per day: Daily  Current Treatment Recommendations: Strengthening, Balance Training, Functional Mobility Training, Transfer Training, Endurance Training, Gait Training, Patient/Caregiver Education & Training, Home Exercise Program, Safety Education & Training, Positioning  Safety Devices  Type of devices: Call light within reach, Gait belt, Left in chair, Nurse notified  Restraints  Initially in place: No      AM-PAC Score  AM-PAC Inpatient Mobility Raw Score : 9 (12/05/20 1506)  AM-PAC Inpatient T-Scale Score : 30.55 (12/05/20 1506)  Mobility Inpatient CMS 0-100% Score: 81.38 (12/05/20 1506)  Mobility Inpatient CMS G-Code Modifier : CM (12/05/20 1506)          Goals  Short term goals  Time Frame for Short term goals: 14 visits  Short term goal 1: Pt to ambulate 10ft min A RW to allow for improving independence with mobility  Short term goal 2: Pt to sit <> stand transfer min A to allow for improving independence with mobility  Short term goal 3: Pt to demonstrate min A bed mobility to allow for improving independence with mobility  Short term goal 4: Pt to demonstrate fair standing and good - seated balance to decrease risk of falls  Short term goal 5: Pt to tolerate 30 minutes of therapy for endurance  Patient Goals   Patient goals : Pt would like to feel better       Therapy Time   Individual Concurrent Group Co-treatment   Time In 1122         Time Out 1207         Minutes 45         Timed Code Treatment Minutes: [address], PT

## 2020-12-06 ENCOUNTER — APPOINTMENT (OUTPATIENT)
Dept: GENERAL RADIOLOGY | Age: 70
DRG: 291 | End: 2020-12-06
Payer: MEDICARE

## 2020-12-06 LAB
ANION GAP SERPL CALCULATED.3IONS-SCNC: 7 MMOL/L (ref 9–17)
BUN BLDV-MCNC: 41 MG/DL (ref 8–23)
BUN/CREAT BLD: ABNORMAL (ref 9–20)
CALCIUM SERPL-MCNC: 9.9 MG/DL (ref 8.6–10.4)
CHLORIDE BLD-SCNC: 88 MMOL/L (ref 98–107)
CO2: 41 MMOL/L (ref 20–31)
CREAT SERPL-MCNC: 1.12 MG/DL (ref 0.5–0.9)
GFR AFRICAN AMERICAN: 59 ML/MIN
GFR NON-AFRICAN AMERICAN: 48 ML/MIN
GFR SERPL CREATININE-BSD FRML MDRD: ABNORMAL ML/MIN/{1.73_M2}
GFR SERPL CREATININE-BSD FRML MDRD: ABNORMAL ML/MIN/{1.73_M2}
GLUCOSE BLD-MCNC: 230 MG/DL (ref 65–105)
GLUCOSE BLD-MCNC: 249 MG/DL (ref 65–105)
GLUCOSE BLD-MCNC: 258 MG/DL (ref 70–99)
GLUCOSE BLD-MCNC: 301 MG/DL (ref 65–105)
HCT VFR BLD CALC: 38.6 % (ref 36–46)
HEMOGLOBIN: 12.4 G/DL (ref 12–16)
MAGNESIUM: 1.8 MG/DL (ref 1.6–2.6)
MCH RBC QN AUTO: 30.9 PG (ref 26–34)
MCHC RBC AUTO-ENTMCNC: 32.2 G/DL (ref 31–37)
MCV RBC AUTO: 95.8 FL (ref 80–100)
NRBC AUTOMATED: ABNORMAL PER 100 WBC
PDW BLD-RTO: 16.1 % (ref 12.5–15.4)
PLATELET # BLD: 144 K/UL (ref 140–450)
PMV BLD AUTO: 9.8 FL (ref 6–12)
POTASSIUM SERPL-SCNC: 3.8 MMOL/L (ref 3.7–5.3)
RBC # BLD: 4.03 M/UL (ref 4–5.2)
SODIUM BLD-SCNC: 136 MMOL/L (ref 135–144)
WBC # BLD: 7.8 K/UL (ref 3.5–11)

## 2020-12-06 PROCEDURE — 2700000000 HC OXYGEN THERAPY PER DAY

## 2020-12-06 PROCEDURE — 71045 X-RAY EXAM CHEST 1 VIEW: CPT

## 2020-12-06 PROCEDURE — 6370000000 HC RX 637 (ALT 250 FOR IP): Performed by: NURSE PRACTITIONER

## 2020-12-06 PROCEDURE — APPSS45 APP SPLIT SHARED TIME 31-45 MINUTES: Performed by: NURSE PRACTITIONER

## 2020-12-06 PROCEDURE — 99233 SBSQ HOSP IP/OBS HIGH 50: CPT | Performed by: INTERNAL MEDICINE

## 2020-12-06 PROCEDURE — 36415 COLL VENOUS BLD VENIPUNCTURE: CPT

## 2020-12-06 PROCEDURE — 94640 AIRWAY INHALATION TREATMENT: CPT

## 2020-12-06 PROCEDURE — 2000000000 HC ICU R&B

## 2020-12-06 PROCEDURE — 6370000000 HC RX 637 (ALT 250 FOR IP): Performed by: INTERNAL MEDICINE

## 2020-12-06 PROCEDURE — 80048 BASIC METABOLIC PNL TOTAL CA: CPT

## 2020-12-06 PROCEDURE — 6360000002 HC RX W HCPCS: Performed by: INTERNAL MEDICINE

## 2020-12-06 PROCEDURE — 94761 N-INVAS EAR/PLS OXIMETRY MLT: CPT

## 2020-12-06 PROCEDURE — 2580000003 HC RX 258: Performed by: NURSE PRACTITIONER

## 2020-12-06 PROCEDURE — 85027 COMPLETE CBC AUTOMATED: CPT

## 2020-12-06 PROCEDURE — 83735 ASSAY OF MAGNESIUM: CPT

## 2020-12-06 PROCEDURE — 82947 ASSAY GLUCOSE BLOOD QUANT: CPT

## 2020-12-06 RX ORDER — DILTIAZEM HYDROCHLORIDE 180 MG/1
360 CAPSULE, COATED, EXTENDED RELEASE ORAL DAILY
Status: DISCONTINUED | OUTPATIENT
Start: 2020-12-06 | End: 2020-12-07

## 2020-12-06 RX ORDER — FUROSEMIDE 10 MG/ML
40 INJECTION INTRAMUSCULAR; INTRAVENOUS DAILY
Status: DISCONTINUED | OUTPATIENT
Start: 2020-12-07 | End: 2020-12-07

## 2020-12-06 RX ADMIN — IPRATROPIUM BROMIDE AND ALBUTEROL SULFATE 1 AMPULE: .5; 3 SOLUTION RESPIRATORY (INHALATION) at 12:50

## 2020-12-06 RX ADMIN — CARVEDILOL 3.12 MG: 3.12 TABLET, FILM COATED ORAL at 08:33

## 2020-12-06 RX ADMIN — OXYCODONE HYDROCHLORIDE AND ACETAMINOPHEN 1 TABLET: 5; 325 TABLET ORAL at 08:33

## 2020-12-06 RX ADMIN — APIXABAN 5 MG: 5 TABLET, FILM COATED ORAL at 08:33

## 2020-12-06 RX ADMIN — MEGESTROL ACETATE 40 MG: 40 SUSPENSION ORAL at 08:32

## 2020-12-06 RX ADMIN — INSULIN LISPRO 6 UNITS: 100 INJECTION, SOLUTION INTRAVENOUS; SUBCUTANEOUS at 08:32

## 2020-12-06 RX ADMIN — PANTOPRAZOLE SODIUM 40 MG: 40 TABLET, DELAYED RELEASE ORAL at 06:33

## 2020-12-06 RX ADMIN — CARVEDILOL 3.12 MG: 3.12 TABLET, FILM COATED ORAL at 17:02

## 2020-12-06 RX ADMIN — LATANOPROST 1 DROP: 50 SOLUTION OPHTHALMIC at 21:03

## 2020-12-06 RX ADMIN — DILTIAZEM HYDROCHLORIDE 360 MG: 180 CAPSULE, COATED, EXTENDED RELEASE ORAL at 12:10

## 2020-12-06 RX ADMIN — APIXABAN 5 MG: 5 TABLET, FILM COATED ORAL at 21:03

## 2020-12-06 RX ADMIN — IPRATROPIUM BROMIDE AND ALBUTEROL SULFATE 1 AMPULE: .5; 3 SOLUTION RESPIRATORY (INHALATION) at 09:07

## 2020-12-06 RX ADMIN — SODIUM CHLORIDE, PRESERVATIVE FREE 10 ML: 5 INJECTION INTRAVENOUS at 08:32

## 2020-12-06 RX ADMIN — IPRATROPIUM BROMIDE AND ALBUTEROL SULFATE 1 AMPULE: .5; 3 SOLUTION RESPIRATORY (INHALATION) at 17:53

## 2020-12-06 RX ADMIN — INSULIN LISPRO 8 UNITS: 100 INJECTION, SOLUTION INTRAVENOUS; SUBCUTANEOUS at 17:02

## 2020-12-06 RX ADMIN — ASPIRIN 81 MG: 81 TABLET, COATED ORAL at 08:33

## 2020-12-06 RX ADMIN — FUROSEMIDE 40 MG: 10 INJECTION, SOLUTION INTRAMUSCULAR; INTRAVENOUS at 06:33

## 2020-12-06 RX ADMIN — METHYLPREDNISOLONE SODIUM SUCCINATE 40 MG: 40 INJECTION, POWDER, FOR SOLUTION INTRAMUSCULAR; INTRAVENOUS at 08:32

## 2020-12-06 RX ADMIN — INSULIN LISPRO 4 UNITS: 100 INJECTION, SOLUTION INTRAVENOUS; SUBCUTANEOUS at 12:13

## 2020-12-06 RX ADMIN — INSULIN LISPRO 2 UNITS: 100 INJECTION, SOLUTION INTRAVENOUS; SUBCUTANEOUS at 21:03

## 2020-12-06 RX ADMIN — IPRATROPIUM BROMIDE AND ALBUTEROL SULFATE 1 AMPULE: .5; 3 SOLUTION RESPIRATORY (INHALATION) at 22:05

## 2020-12-06 RX ADMIN — SODIUM CHLORIDE, PRESERVATIVE FREE 10 ML: 5 INJECTION INTRAVENOUS at 21:03

## 2020-12-06 RX ADMIN — MEGESTROL ACETATE 40 MG: 40 SUSPENSION ORAL at 21:15

## 2020-12-06 ASSESSMENT — ENCOUNTER SYMPTOMS
VOMITING: 0
SHORTNESS OF BREATH: 0
SHORTNESS OF BREATH: 1
SORE THROAT: 0
TROUBLE SWALLOWING: 0
COUGH: 0
WHEEZING: 0
NAUSEA: 0
CHEST TIGHTNESS: 0
ABDOMINAL PAIN: 0
BLOOD IN STOOL: 0
DIARRHEA: 0
CONSTIPATION: 0
COUGH: 1

## 2020-12-06 ASSESSMENT — PAIN DESCRIPTION - PROGRESSION

## 2020-12-06 ASSESSMENT — PAIN SCALES - GENERAL
PAINLEVEL_OUTOF10: 0
PAINLEVEL_OUTOF10: 7
PAINLEVEL_OUTOF10: 0
PAINLEVEL_OUTOF10: 7

## 2020-12-06 NOTE — PROGRESS NOTES
Negative for abdominal pain, constipation, diarrhea, nausea and vomiting. Genitourinary: Negative for difficulty urinating, dysuria and frequency. Musculoskeletal: Negative for arthralgias and joint swelling. Skin: Negative for rash. Allergic/Immunologic: Negative for immunocompromised state. Neurological: Positive for weakness. Negative for dizziness, speech difficulty and headaches. Hematological: Negative for adenopathy. Psychiatric/Behavioral: Negative for behavioral problems and sleep disturbance.      OBJECTIVE     VITAL SIGNS:   LAST-  /83   Pulse 109   Temp 98.5 °F (36.9 °C) (Oral)   Resp 18   Ht 5' 7.5\" (1.715 m)   Wt 264 lb 4.8 oz (119.9 kg)   SpO2 93%   BMI 40.78 kg/m²   8-24 HR RANGE-  TEMP Temp  Av.3 °F (36.8 °C)  Min: 97.7 °F (36.5 °C)  Max: 98.7 °F (82.2 °C)   BP Systolic (00PGB), QWH:225 , Min:102 , RSC:697      Diastolic (22BSK), FBK:01, Min:60, Max:83     PULSE Pulse  Av.5  Min: 92  Max: 110   RR Resp  Av  Min: 18  Max: 18   O2 SAT SpO2  Av %  Min: 93 %  Max: 93 %   OXYGEN DELIVERY O2 Flow Rate (L/min)  Av L/min  Min: 2 L/min  Max: 2 L/min     Systemic Examination:   General appearance - looks comfortable and in no acute distress  Mental status - alert, oriented to person, place, and time  Eyes - pupils equal and reactive, extraocular eye movements intact  Mouth - mucous membranes moist, pharynx normal without lesions  Neck - supple, no significant adenopathy  Chest -breath sounds diminished bilaterally with bibasilar crackles  Heart - normal rate, regular rhythm, normal S1, S2, no murmurs, rubs, clicks or gallops  Abdomen - soft, nontender, nondistended, no masses or organomegaly  Neurological - alert, oriented, normal speech, no focal findings or movement disorder noted  Extremities - peripheral pulses normal, 1+ pedal edema, no clubbing or cyanosis  Skin - normal coloration and turgor, no rashes, no suspicious skin lesions noted     DATA REVIEW Medications:  Scheduled Meds:   dilTIAZem  360 mg Oral Daily    [START ON 12/7/2020] furosemide  40 mg Intravenous Daily    insulin lispro  0-12 Units Subcutaneous TID     insulin lispro  0-6 Units Subcutaneous Nightly    methylPREDNISolone  40 mg Intravenous Daily    ipratropium-albuterol  1 ampule Inhalation Q4H WA    carvedilol  3.125 mg Oral BID     sodium chloride flush  10 mL Intravenous 2 times per day    aspirin  81 mg Oral Daily    latanoprost  1 drop Both Eyes Nightly    megestrol  40 mg Oral BID    pantoprazole  40 mg Oral QAM     sodium chloride flush  10 mL Intravenous Once    apixaban  5 mg Oral BID     Continuous Infusions:   dextrose       LABS:-  ABG:   Recent Labs     12/04/20  1102   POCPH 7.38   POCPCO2 64*   POCPO2 89   POCHCO3 38.5*   LMPU5UIF 96     CBC:   Recent Labs     12/03/20 2212 12/04/20 0914 12/05/20 0537 12/06/20  0728   WBC 9.8 7.5 4.6 7.8   HGB 12.3 12.3 11.8* 12.4   HCT 38.2 37.8 37.4 38.6   MCV 96.8 96.3 96.4 95.8    148 133* 144   LYMPHOPCT 8*  --   --   --    RBC 3.94* 3.93* 3.87* 4.03   MCH 31.3 31.3 30.6 30.9   MCHC 32.4 32.5 31.7 32.2   RDW 16.5* 16.6* 15.8* 16.1*     BMP:   Recent Labs     12/04/20  0914 12/05/20 0537 12/05/20 1958 12/06/20  0728    141  --  136   K 4.6 3.6* 3.4* 3.8   CL 99 95*  --  88*   CO2 36* 37*  --  41*   BUN 54* 44*  --  41*   CREATININE 2.07* 1.34*  --  1.12*   GLUCOSE 128* 243*  --  258*     Liver Function Test:   No results for input(s): PROT, LABALBU, ALT, AST, GGT, ALKPHOS, BILITOT in the last 72 hours. Amylase/Lipase:  No results for input(s): AMYLASE, LIPASE in the last 72 hours. Coagulation Profile:   Recent Labs     12/04/20  1559   APTT 29.4     Cardiac Enzymes:  No results for input(s): CKTOTAL, CKMB, CKMBINDEX, TROPONINI in the last 72 hours.   Lactic Acid:  Lab Results   Component Value Date    LACTA 2.0 12/04/2020    LACTA 2.4 (H) 12/03/2020     BNP:   No results found for: BNP  D-Dimer:  Lab Results   Component Value Date    DDIMER 6.55 12/03/2020     Others:   Lab Results   Component Value Date    TSH 0.14 (L) 12/05/2020     No results found for: CHEN, RHEUMFACTOR, SEDRATE, CRP  No results found for: David Estrella  No results found for: IRON, TIBC, FERRITIN  No results found for: SPEP, UPEP  No results found for: PSA, CEA, , [id number],     Input/Output:    Intake/Output Summary (Last 24 hours) at 12/6/2020 1712  Last data filed at 12/6/2020 1634  Gross per 24 hour   Intake 620 ml   Output 3200 ml   Net -2580 ml       Microbiology:  Recent Labs     12/04/20  1230   SPECDESC . FACE SWAB   SPECIAL NOT REPORTED   CULTURE CULTURE IN PROGRESS       Pathology:    Radiology reports:  XR CHEST PORTABLE   Final Result   No significant change in chest findings. XR CHEST PORTABLE   Final Result   No significant change in chest findings compared to the prior study. CHF   findings persist.         NM LUNG SCAN PERFUSION ONLY   Final Result   Indeterminate for pulmonary embolism given presence of effusion and 2   moderate size perfusion defects. This does not meet criteria for high   probability for pulmonary embolism. XR CHEST PORTABLE   Final Result   Cardiomegaly with perihilar congestion. Bilateral effusions and airspace opacities favor atelectasis. NM LUNG VENT/PERFUSION (VQ)    (Results Pending)   US DUP LOWER EXTREMITY RIGHT NELIDA    (Results Pending)   US DUP LOWER EXTREMITY LEFT NELIDA    (Results Pending)       Echocardiogram:   Results for orders placed during the hospital encounter of 12/03/20   ECHO Complete 2D W Doppler W Color    Narrative   Summary  Left ventricle is dilated. Global left ventricular systolic function is  moderate to severely reduced Estimated ejection fraction is 30-35 %. Mild left ventricular hypertrophy. Leftward compression of inter-ventricular septum (\"D-sign\") indicating RV  pressure overload. False tendon seen the apex of the heart.   Left atrium is severely dilated. Inter-atrial septum appears so be intact. Right atrium is severely dilated . Severely dilated right ventricular cavity. Reduced right ventricular function. Aortic leaflet calcification with mild stenosis. Moderate aortic insufficiency. Aortic valve is trileaflet. Normal mitral valve structure and function. Mitral annular calcification is seen. Mild-moderate mitral regurgitation. No mitral stenosis. Normal tricuspid valve structure and function. Mild to moderate tricuspid regurgitation. Estimated right ventricular systolic pressure is 42.66 mmHg. Mild pulmonary hypertension, May be underestimated due to direction of  regurgitant jet. The pulmonic valve is normal in structure. Mild pulmonic insufficiency. No evidence of pulmonic stenosis. Trivial pericardial effusion. No signs of cardiac tamponade. Left pleural effusion. Aortic root is mildly dilated and ascending aorta is moderately dilated  measuring 4.6 cm  IVC Increased diameter and impaired or no inspiratory variation indicating  elevated RA filling pressure (i.e. CVP) . E/E' average = 11.75. Cardiac Catheterization:   No results found for this or any previous visit.     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  // Chronic obstructive pulmonary disease  // Paroxysmal atrial fibrillation with RVR  // Chronic kidney disease  // Pulmonary hypertension  // Obstructive sleep apnea  // Morbid obesity  // Type 2 diabetes mellitus  // Benign essential hypertension    Plan:     Patient remains hemodynamically stable and is currently saturating well on nasal cannula    Continue supplemental oxygen to keep oxygen saturation greater than 92%   Continue nocturnal and as needed noninvasive mechanical ventilation (BiPAP)   Continue Eliquis, Cardizem resumed due to rapid ventricular rate   Encourage incentive spirometry   Continue pulmonary toilet, aspiration precautions and bronchodilators   Continue diuresis   Continue to monitor electrolytes, I/O with a goal of even/negative fluid balance   Antimicrobials reviewed; currently being monitored off antimicrobials   We will start her on prednisone taper   Patient was counseled about smoking cessation   DVT and stress ulcer prophylaxis   Physical/occupational therapy; increase activity as tolerated    It was my pleasure to evaluate Chadwick Yoder today. We will continue to follow. I would like to thank you for allowing me to participate in the care of this patient. Please feel free to call with any further questions or concerns. Ginger Luciano M.D. Pulmonary and Critical Care Medicine           12/6/2020, 5:12 PM    Please note that this chart was generated using voice recognition Dragon dictation software. Although every effort was made to ensure the accuracy of this automated transcription, some errors in transcription may have occurred.

## 2020-12-06 NOTE — PROGRESS NOTES
[facility]  Office: [address], DO, Amador Rodriguezzohreh, DO, Peyman Amairani, DO, Jennifer Miller Blood, DO, Lisandro Zuñiga MD, Ernesto Mar MD, Anthony Galicia MD, Yulissa Watson MD, Reji Lynne MD, Alanis Eldridge MD, Fer Schilling MD, Josh Cavanaugh MD, Mbonu Fleet Cowden, MD, Sanchez Arango DO, Marley Baptiste MD, Stephenie Mantilla MD, Katie Torre, DO, Vik Echavarria MD,  Humza Levine DO, Claudia Walton MD, Shannon Feliz MD, Yenny Roper, [facility], [facility], CNP, Danay Smith, CNP, Kaylee Hdz, [facility], Christopher Patterson, CNP, Kortney Gil, CNP, Mohamud Carreon, CNP, Edinson Mcadams, CNP, Drew Menjivar, CNP, Celeste Ramsay PA-C, Maggie Thomas, [facility], Payal Mcrae, CNP, Winter Cazares, CNP, Riley Hooper, CNP, Maggi Ramirez, CNP, Roddy July, [address]    Progress Note    12/6/2020    1:01 PM    Name:   Cindy Mae  MRN:     [id number]     Acct:      [de-identified]   Room:   [address] [place] [address] Day:  2  Admit Date:  12/3/2020 10:16 PM    PCP:   Deonte Chou MD  Code Status:  Full Code    Subjective:     C/C:   Chief Complaint   Patient presents with    Shortness of Breath     70s per ems from nursing home     Interval History Status: improved. She states she feels good. She is calling asking friends a family to sneak her in pie and other foods. Staff has spoken to her about diabetes and high blood sugars. Lungs remain diminished t/o but her o2 has greatly decreased since admission. Lasix IV decreased to daily    Brief History:     Per previous documentation    Cindy Mae is a 78 y.o. Non-/non  female who presents with Shortness of Breath (70s per ems from nursing home)   and is admitted to the hospital for the management of Hypoxia.     The patient was brought to the ER per EMS related to hypoxia. The patient states she was just discharged to a facilty a hospitalization in [facility].  She presented there because of pain in her left foot, frequent falls, and weakness. She was found to be in A fib with RVR. A chest xray showed right pleural effusion and atelectasis or infiltrate. BNP during that visit less than 900. She was discharged on bumex. According the ER note the patients sats were in the 70's at the facility. She denies feeling particularly sob. She denies fever, chills, N/V. No chest pain or palpitations. She does have chronic wounds to her chin and buttocks that she follows with wound clinic. All of her doctors are from [facility] including Dr Lance Arboleda, Dr Kassandra Gardner? and Dr Emmy Marcos. She does continue to have monthly injections per oncology for bone mets. She states she wears 2 l nc through her bipap. The patient states she is compliant with hr medications.      In the ER she was placed on highflo, started on Rocephin and Zithromax IV, Lovenox at 0.5 mg/kg. CXR showsCardiomegaly with perihilar congestion. Bilateral effusions and airspace opacities favor atelectasis  A ct was not completed r/t creatine of 2.4. a VQ scan scan this morning was Indeterminate for pulmonary embolism. After obtaining her Echo that showed Leftward compression of inter-ventricular septum (\"D-sign\") indicating RV pressure overload. False tendon seen the apex of the heart we started her high intensity heparin protocol. Eliquis on hold for now. Pulmonary consulted. Per Dr Venkat Robert to restart eliquis and dc heparin gtt from pulmonary standpoint if no objection by cardiology in light of valvular Afib . Dc antibiotics - not pneumonia. Per Dr. Pastor Hobson LVEF on Cardizem will DC at this time, plan to start on Coreg once the blood pressure is improved. Ok to stop heparin and resume Eliquis. will continue Lasix. Review of Systems:     Review of Systems   Constitutional: Negative for chills, diaphoresis and fever. HENT: Negative for congestion. Eyes: Negative for visual disturbance.    Respiratory: Negative for cough, chest tightness, shortness of breath and wheezing. Cardiovascular: Negative for chest pain, palpitations and leg swelling. Gastrointestinal: Negative for abdominal pain, blood in stool, constipation, diarrhea, nausea and vomiting. Genitourinary: Negative for difficulty urinating. Skin: Positive for wound. Neurological: Negative for dizziness, weakness, light-headedness, numbness and headaches. All other systems reviewed and are negative. Medications: Allergies: Allergies   Allergen Reactions    Ciprofloxacin        Current Meds:   Scheduled Meds:    dilTIAZem  360 mg Oral Daily    insulin lispro  0-12 Units Subcutaneous TID WC    insulin lispro  0-6 Units Subcutaneous Nightly    methylPREDNISolone  40 mg Intravenous Daily    ipratropium-albuterol  1 ampule Inhalation Q4H WA    carvedilol  3.125 mg Oral BID WC    sodium chloride flush  10 mL Intravenous 2 times per day    aspirin  81 mg Oral Daily    latanoprost  1 drop Both Eyes Nightly    megestrol  40 mg Oral BID    pantoprazole  40 mg Oral QAM AC    sodium chloride flush  10 mL Intravenous Once    apixaban  5 mg Oral BID    furosemide  40 mg Intravenous Q12H     Continuous Infusions:    dextrose       PRN Meds: magnesium sulfate, potassium chloride **OR** potassium alternative oral replacement **OR** potassium chloride, acetaminophen **OR** acetaminophen, promethazine **OR** ondansetron, albuterol, sodium chloride flush, polyethylene glycol, oxyCODONE-acetaminophen, glucose, dextrose, glucagon (rDNA), dextrose    Data:     Past Medical History:   has a past medical history of CHF (congestive heart failure) ([facility] CKD (chronic kidney disease), COPD [facility]ch[facility] obstructive [facility] disease) [facility] Diabetes mellitus ([facility] Hyperlipidemia, Hypertension, and Vertigo. Social History:   reports that she has never smoked. She has never used smokeless tobacco. She reports previous alcohol use. She reports that she does not use drugs. interval not displayed. Recent Labs     12/04/20 2012 12/05/20  0537 12/05/20  0849 12/05/20  1214 12/05/20  1706 12/05/20  1957 12/06/20  1210   TSH  --  0.14*  --   --   --   --   --    CHOL  --  80  --   --   --   --   --    HDL  --  30*  --   --   --   --   --    LDLCHOLESTEROL  --  36  --   --   --   --   --    CHOLHDLRATIO  --  2.7  --   --   --   --   --    TRIG  --  68  --   --   --   --   --    VLDL  --  NOT REPORTED  --   --   --   --   --    POCGLU 348*  --  210* 352* 238* 205* 230*     ABG:  Lab Results   Component Value Date    POCPH 7.38 12/04/2020    POCPCO2 64 12/04/2020    POCPO2 89 12/04/2020    POCHCO3 38.5 12/04/2020    NBEA NOT REPORTED 12/04/2020    PBEA 13 12/04/2020    HML1LCC 40 12/04/2020    WSRI1LGM 96 12/04/2020    FIO2 75.0 12/04/2020     Lab Results   Component Value Date/Time    SPECIAL NOT REPORTED 12/04/2020 12:30 PM     Lab Results   Component Value Date/Time    CULTURE CULTURE IN PROGRESS 12/04/2020 12:30 PM       Radiology:  Nm Lung Scan Perfusion Only    Result Date: 12/4/2020  Indeterminate for pulmonary embolism given presence of effusion and 2 moderate size perfusion defects. This does not meet criteria for high probability for pulmonary embolism. Xr Chest Portable    Result Date: 12/5/2020  No significant change in chest findings compared to the prior study. CHF findings persist.     Xr Chest Portable    Result Date: 12/3/2020  Cardiomegaly with perihilar congestion. Bilateral effusions and airspace opacities favor atelectasis. Physical Examination:     Physical Exam  Vitals signs and nursing note reviewed. Constitutional:       General: She is awake. HENT:      Mouth/Throat:      Mouth: Mucous membranes are moist.   Cardiovascular:      Rate and Rhythm: Normal rate. Rhythm irregular. Pulses: Normal pulses. Pulmonary:      Effort: Pulmonary effort is normal.      Breath sounds: Examination of the right-middle field reveals decreased breath sounds. Examination of the left-middle field reveals decreased breath sounds. Examination of the right-lower field reveals decreased breath sounds. Examination of the left-lower field reveals decreased breath sounds. Decreased breath sounds present. Abdominal:      General: Bowel sounds are normal.      Palpations: Abdomen is soft. Musculoskeletal: Normal range of motion. General: Tenderness present. Skin:     General: Skin is warm and dry. Capillary Refill: Capillary refill takes less than 2 seconds. Comments: Open area to chin is dry and crusted   Neurological:      General: No focal deficit present. Mental Status: She is alert. GCS: GCS eye subscore is 4. GCS verbal subscore is 5. GCS motor subscore is 6. Sensory: Sensation is intact. Psychiatric:         Mood and Affect: Mood normal.         Behavior: Behavior is cooperative. Assessment:     Hospital Problems           Last Modified POA    * (Principal) Hypoxia 12/4/2020 Yes    Chronic obstructive pulmonary disease with acute exacerbation ([address] 12/4/2020 Yes    Hypertension 12/4/2020 Yes    Paroxysmal atrial fibrillation ([address] 12/4/2020 Yes    Sleep apnea 12/4/2020 Yes    CKD (chronic kidney disease) 12/4/2020 Yes    Type 2 diabetes mellitus, without long-term current use of insulin ([address] 12/4/2020 Yes    Acute on chronic combined systolic and diastolic congestive heart failure ([address] 12/4/2020 Yes    Pneumonia due to infectious organism 12/4/2020 Yes    Acute on chronic respiratory failure ([address] 12/4/2020 Yes    Overview Signed 12/4/2020  4:51 PM by EMY Millard CNP     2 l nc through bipap at night         Pneumonia due to organism 12/4/2020 Yes          Plan:     Acute on chronic respiratory failure with hypoxia; pulmonary medicine consulted. Continue high flow pending their recommendations.  Wean high flow - keep SATs 88-92 %   Use BiPAP at night keep SATs 88-92 %   Solu-Medrol IV, respiratory treatments as ordered. Dc antibiotics - not pneumonia      Acute on chronic systolic and diastolic heart failure; echo completed. Cardiology and pulmonary reviewed Echo they believe CHF . Lasix 40 mg IV changed to daily. Cardiology following. BNP every other day. Cardizem resumed     LYSSA; patient has a history of CKD; avoid nephrotoxic agents. Monitor BMP     Pneumonia; ruled out     Type 2 diabetes; insulin sliding scale for glucose management.   Hold Glucophage for now related to LYSSA     A. fib; resume Eliquis at this time.      Mechanical DVT prophylaxis for now     PPI     Pain meds for complaints of chronic left leg pain     PT OT to evaluate and treat      Continue to monitor I&O and telemetry.     Recommend congestive heart failure nurse outpatient    EMY Smith CNP  12/6/2020  1:01 PM

## 2020-12-06 NOTE — PROGRESS NOTES
Pt refused BIPAP. Patient encouraged to wear for sleep, still refusing. Spo2 92% on 2lpm, , RR 16. Will continue to monitor.

## 2020-12-07 ENCOUNTER — APPOINTMENT (OUTPATIENT)
Dept: ULTRASOUND IMAGING | Age: 70
DRG: 291 | End: 2020-12-07
Payer: MEDICARE

## 2020-12-07 VITALS
DIASTOLIC BLOOD PRESSURE: 65 MMHG | TEMPERATURE: 98.5 F | SYSTOLIC BLOOD PRESSURE: 108 MMHG | HEIGHT: 68 IN | BODY MASS INDEX: 40.32 KG/M2 | RESPIRATION RATE: 20 BRPM | OXYGEN SATURATION: 98 % | WEIGHT: 266 LBS | HEART RATE: 80 BPM

## 2020-12-07 PROBLEM — I50.9 CONGESTIVE HEART FAILURE OF UNKNOWN ETIOLOGY (HCC): Status: ACTIVE | Noted: 2020-12-07

## 2020-12-07 LAB
ANION GAP SERPL CALCULATED.3IONS-SCNC: 5 MMOL/L (ref 9–17)
BNP INTERPRETATION: ABNORMAL
BUN BLDV-MCNC: 43 MG/DL (ref 8–23)
BUN/CREAT BLD: ABNORMAL (ref 9–20)
CALCIUM SERPL-MCNC: 9.7 MG/DL (ref 8.6–10.4)
CHLORIDE BLD-SCNC: 91 MMOL/L (ref 98–107)
CO2: 42 MMOL/L (ref 20–31)
CREAT SERPL-MCNC: 1.12 MG/DL (ref 0.5–0.9)
CULTURE: ABNORMAL
DIRECT EXAM: ABNORMAL
EKG ATRIAL RATE: 214 BPM
EKG ATRIAL RATE: 258 BPM
EKG Q-T INTERVAL: 316 MS
EKG Q-T INTERVAL: 396 MS
EKG QRS DURATION: 102 MS
EKG QRS DURATION: 94 MS
EKG QTC CALCULATION (BAZETT): 459 MS
EKG QTC CALCULATION (BAZETT): 460 MS
EKG R AXIS: -13 DEGREES
EKG R AXIS: 6 DEGREES
EKG T AXIS: 0 DEGREES
EKG T AXIS: 18 DEGREES
EKG VENTRICULAR RATE: 127 BPM
EKG VENTRICULAR RATE: 81 BPM
GFR AFRICAN AMERICAN: 59 ML/MIN
GFR NON-AFRICAN AMERICAN: 48 ML/MIN
GFR SERPL CREATININE-BSD FRML MDRD: ABNORMAL ML/MIN/{1.73_M2}
GFR SERPL CREATININE-BSD FRML MDRD: ABNORMAL ML/MIN/{1.73_M2}
GLUCOSE BLD-MCNC: 202 MG/DL (ref 65–105)
GLUCOSE BLD-MCNC: 224 MG/DL (ref 70–99)
GLUCOSE BLD-MCNC: 277 MG/DL (ref 65–105)
GLUCOSE BLD-MCNC: 294 MG/DL (ref 65–105)
HCT VFR BLD CALC: 38.6 % (ref 36–46)
HEMOGLOBIN: 12.4 G/DL (ref 12–16)
Lab: ABNORMAL
MCH RBC QN AUTO: 31 PG (ref 26–34)
MCHC RBC AUTO-ENTMCNC: 32.1 G/DL (ref 31–37)
MCV RBC AUTO: 96.6 FL (ref 80–100)
NRBC AUTOMATED: ABNORMAL PER 100 WBC
PDW BLD-RTO: 16.2 % (ref 12.5–15.4)
PLATELET # BLD: 133 K/UL (ref 140–450)
PMV BLD AUTO: 9.6 FL (ref 6–12)
POTASSIUM SERPL-SCNC: 3.9 MMOL/L (ref 3.7–5.3)
PRO-BNP: 6225 PG/ML
RBC # BLD: 3.99 M/UL (ref 4–5.2)
SODIUM BLD-SCNC: 138 MMOL/L (ref 135–144)
SPECIMEN DESCRIPTION: ABNORMAL
WBC # BLD: 7.1 K/UL (ref 3.5–11)

## 2020-12-07 PROCEDURE — 93971 EXTREMITY STUDY: CPT

## 2020-12-07 PROCEDURE — 6370000000 HC RX 637 (ALT 250 FOR IP): Performed by: NURSE PRACTITIONER

## 2020-12-07 PROCEDURE — 36415 COLL VENOUS BLD VENIPUNCTURE: CPT

## 2020-12-07 PROCEDURE — 83880 ASSAY OF NATRIURETIC PEPTIDE: CPT

## 2020-12-07 PROCEDURE — 94664 DEMO&/EVAL PT USE INHALER: CPT

## 2020-12-07 PROCEDURE — 94761 N-INVAS EAR/PLS OXIMETRY MLT: CPT

## 2020-12-07 PROCEDURE — 80048 BASIC METABOLIC PNL TOTAL CA: CPT

## 2020-12-07 PROCEDURE — 2700000000 HC OXYGEN THERAPY PER DAY

## 2020-12-07 PROCEDURE — 97535 SELF CARE MNGMENT TRAINING: CPT

## 2020-12-07 PROCEDURE — 82947 ASSAY GLUCOSE BLOOD QUANT: CPT

## 2020-12-07 PROCEDURE — 2580000003 HC RX 258: Performed by: NURSE PRACTITIONER

## 2020-12-07 PROCEDURE — 97530 THERAPEUTIC ACTIVITIES: CPT

## 2020-12-07 PROCEDURE — APPSS45 APP SPLIT SHARED TIME 31-45 MINUTES: Performed by: NURSE PRACTITIONER

## 2020-12-07 PROCEDURE — 2000000000 HC ICU R&B

## 2020-12-07 PROCEDURE — 85027 COMPLETE CBC AUTOMATED: CPT

## 2020-12-07 PROCEDURE — 6370000000 HC RX 637 (ALT 250 FOR IP): Performed by: INTERNAL MEDICINE

## 2020-12-07 PROCEDURE — 94640 AIRWAY INHALATION TREATMENT: CPT

## 2020-12-07 PROCEDURE — 99233 SBSQ HOSP IP/OBS HIGH 50: CPT | Performed by: INTERNAL MEDICINE

## 2020-12-07 RX ORDER — SODIUM CHLORIDE 0.9 % (FLUSH) 0.9 %
10 SYRINGE (ML) INJECTION EVERY 12 HOURS SCHEDULED
Status: CANCELLED | OUTPATIENT
Start: 2020-12-07

## 2020-12-07 RX ORDER — MAGNESIUM SULFATE 1 G/100ML
1 INJECTION INTRAVENOUS PRN
Status: CANCELLED | OUTPATIENT
Start: 2020-12-07

## 2020-12-07 RX ORDER — MEGESTROL ACETATE 40 MG/ML
40 SUSPENSION ORAL 2 TIMES DAILY
Status: CANCELLED | OUTPATIENT
Start: 2020-12-07

## 2020-12-07 RX ORDER — POTASSIUM CHLORIDE 7.45 MG/ML
10 INJECTION INTRAVENOUS PRN
Status: CANCELLED | OUTPATIENT
Start: 2020-12-07

## 2020-12-07 RX ORDER — PROMETHAZINE HYDROCHLORIDE 25 MG/1
12.5 TABLET ORAL EVERY 6 HOURS PRN
Status: CANCELLED | OUTPATIENT
Start: 2020-12-07

## 2020-12-07 RX ORDER — AMIODARONE HYDROCHLORIDE 200 MG/1
200 TABLET ORAL 2 TIMES DAILY
Status: DISCONTINUED | OUTPATIENT
Start: 2020-12-07 | End: 2020-12-08 | Stop reason: HOSPADM

## 2020-12-07 RX ORDER — PREDNISONE 10 MG/1
15 TABLET ORAL DAILY
Status: CANCELLED | OUTPATIENT
Start: 2020-12-10 | End: 2020-12-13

## 2020-12-07 RX ORDER — PREDNISONE 20 MG/1
20 TABLET ORAL DAILY
Status: CANCELLED | OUTPATIENT
Start: 2020-12-08 | End: 2020-12-10

## 2020-12-07 RX ORDER — FUROSEMIDE 20 MG/1
40 TABLET ORAL DAILY
Status: CANCELLED | OUTPATIENT
Start: 2020-12-08

## 2020-12-07 RX ORDER — PANTOPRAZOLE SODIUM 40 MG/1
40 TABLET, DELAYED RELEASE ORAL
Status: CANCELLED | OUTPATIENT
Start: 2020-12-08

## 2020-12-07 RX ORDER — PREDNISONE 10 MG/1
5 TABLET ORAL DAILY
Status: CANCELLED | OUTPATIENT
Start: 2020-12-16 | End: 2020-12-19

## 2020-12-07 RX ORDER — PREDNISONE 10 MG/1
10 TABLET ORAL DAILY
Status: CANCELLED | OUTPATIENT
Start: 2020-12-13 | End: 2020-12-16

## 2020-12-07 RX ORDER — CARVEDILOL 12.5 MG/1
25 TABLET ORAL 2 TIMES DAILY WITH MEALS
Status: DISCONTINUED | OUTPATIENT
Start: 2020-12-07 | End: 2020-12-08 | Stop reason: HOSPADM

## 2020-12-07 RX ORDER — SODIUM CHLORIDE 0.9 % (FLUSH) 0.9 %
10 SYRINGE (ML) INJECTION ONCE
Status: CANCELLED | OUTPATIENT
Start: 2020-12-07 | End: 2020-12-07

## 2020-12-07 RX ORDER — DOCUSATE SODIUM 100 MG/1
100 CAPSULE, LIQUID FILLED ORAL DAILY
Status: CANCELLED | OUTPATIENT
Start: 2020-12-08

## 2020-12-07 RX ORDER — IPRATROPIUM BROMIDE AND ALBUTEROL SULFATE 2.5; .5 MG/3ML; MG/3ML
1 SOLUTION RESPIRATORY (INHALATION)
Status: CANCELLED | OUTPATIENT
Start: 2020-12-07

## 2020-12-07 RX ORDER — AMIODARONE HYDROCHLORIDE 200 MG/1
200 TABLET ORAL 2 TIMES DAILY
Status: CANCELLED | OUTPATIENT
Start: 2020-12-08

## 2020-12-07 RX ORDER — POLYETHYLENE GLYCOL 3350 17 G/17G
17 POWDER, FOR SOLUTION ORAL DAILY PRN
Status: CANCELLED | OUTPATIENT
Start: 2020-12-07

## 2020-12-07 RX ORDER — PREDNISONE 10 MG/1
10 TABLET ORAL DAILY
Status: DISCONTINUED | OUTPATIENT
Start: 2020-12-13 | End: 2020-12-08 | Stop reason: HOSPADM

## 2020-12-07 RX ORDER — ONDANSETRON 2 MG/ML
4 INJECTION INTRAMUSCULAR; INTRAVENOUS EVERY 6 HOURS PRN
Status: CANCELLED | OUTPATIENT
Start: 2020-12-07

## 2020-12-07 RX ORDER — ACETAMINOPHEN 650 MG/1
650 SUPPOSITORY RECTAL EVERY 6 HOURS PRN
Status: CANCELLED | OUTPATIENT
Start: 2020-12-07

## 2020-12-07 RX ORDER — DOCUSATE SODIUM 100 MG/1
100 CAPSULE, LIQUID FILLED ORAL DAILY
Status: DISCONTINUED | OUTPATIENT
Start: 2020-12-07 | End: 2020-12-08 | Stop reason: HOSPADM

## 2020-12-07 RX ORDER — FUROSEMIDE 20 MG/1
40 TABLET ORAL DAILY
Status: DISCONTINUED | OUTPATIENT
Start: 2020-12-07 | End: 2020-12-08 | Stop reason: HOSPADM

## 2020-12-07 RX ORDER — CARVEDILOL 12.5 MG/1
25 TABLET ORAL 2 TIMES DAILY WITH MEALS
Status: CANCELLED | OUTPATIENT
Start: 2020-12-08

## 2020-12-07 RX ORDER — NICOTINE POLACRILEX 4 MG
15 LOZENGE BUCCAL PRN
Status: CANCELLED | OUTPATIENT
Start: 2020-12-07

## 2020-12-07 RX ORDER — LATANOPROST 50 UG/ML
1 SOLUTION/ DROPS OPHTHALMIC NIGHTLY
Status: CANCELLED | OUTPATIENT
Start: 2020-12-07

## 2020-12-07 RX ORDER — ALBUTEROL SULFATE 2.5 MG/3ML
2.5 SOLUTION RESPIRATORY (INHALATION)
Status: CANCELLED | OUTPATIENT
Start: 2020-12-07

## 2020-12-07 RX ORDER — PREDNISONE 20 MG/1
20 TABLET ORAL DAILY
Status: DISCONTINUED | OUTPATIENT
Start: 2020-12-07 | End: 2020-12-08 | Stop reason: HOSPADM

## 2020-12-07 RX ORDER — PREDNISONE 10 MG/1
15 TABLET ORAL DAILY
Status: DISCONTINUED | OUTPATIENT
Start: 2020-12-10 | End: 2020-12-08 | Stop reason: HOSPADM

## 2020-12-07 RX ORDER — ACETAMINOPHEN 325 MG/1
650 TABLET ORAL EVERY 6 HOURS PRN
Status: CANCELLED | OUTPATIENT
Start: 2020-12-07

## 2020-12-07 RX ORDER — POTASSIUM CHLORIDE 20 MEQ/1
40 TABLET, EXTENDED RELEASE ORAL PRN
Status: CANCELLED | OUTPATIENT
Start: 2020-12-07

## 2020-12-07 RX ORDER — SODIUM CHLORIDE 0.9 % (FLUSH) 0.9 %
10 SYRINGE (ML) INJECTION PRN
Status: CANCELLED | OUTPATIENT
Start: 2020-12-07

## 2020-12-07 RX ORDER — OXYCODONE HYDROCHLORIDE AND ACETAMINOPHEN 5; 325 MG/1; MG/1
1 TABLET ORAL EVERY 6 HOURS PRN
Status: CANCELLED | OUTPATIENT
Start: 2020-12-07

## 2020-12-07 RX ORDER — ASPIRIN 81 MG/1
81 TABLET ORAL DAILY
Status: CANCELLED | OUTPATIENT
Start: 2020-12-08

## 2020-12-07 RX ORDER — DEXTROSE MONOHYDRATE 25 G/50ML
12.5 INJECTION, SOLUTION INTRAVENOUS PRN
Status: CANCELLED | OUTPATIENT
Start: 2020-12-07

## 2020-12-07 RX ORDER — DEXTROSE MONOHYDRATE 50 MG/ML
100 INJECTION, SOLUTION INTRAVENOUS PRN
Status: CANCELLED | OUTPATIENT
Start: 2020-12-07

## 2020-12-07 RX ORDER — PREDNISONE 10 MG/1
5 TABLET ORAL DAILY
Status: DISCONTINUED | OUTPATIENT
Start: 2020-12-16 | End: 2020-12-08 | Stop reason: HOSPADM

## 2020-12-07 RX ADMIN — MEGESTROL ACETATE 40 MG: 40 SUSPENSION ORAL at 09:38

## 2020-12-07 RX ADMIN — INSULIN LISPRO 6 UNITS: 100 INJECTION, SOLUTION INTRAVENOUS; SUBCUTANEOUS at 17:35

## 2020-12-07 RX ADMIN — IPRATROPIUM BROMIDE AND ALBUTEROL SULFATE 1 AMPULE: .5; 3 SOLUTION RESPIRATORY (INHALATION) at 11:50

## 2020-12-07 RX ADMIN — APIXABAN 5 MG: 5 TABLET, FILM COATED ORAL at 21:47

## 2020-12-07 RX ADMIN — LATANOPROST 1 DROP: 50 SOLUTION OPHTHALMIC at 21:51

## 2020-12-07 RX ADMIN — AMIODARONE HYDROCHLORIDE 200 MG: 200 TABLET ORAL at 17:36

## 2020-12-07 RX ADMIN — PANTOPRAZOLE SODIUM 40 MG: 40 TABLET, DELAYED RELEASE ORAL at 06:27

## 2020-12-07 RX ADMIN — CARVEDILOL 25 MG: 12.5 TABLET, FILM COATED ORAL at 21:51

## 2020-12-07 RX ADMIN — INSULIN LISPRO 4 UNITS: 100 INJECTION, SOLUTION INTRAVENOUS; SUBCUTANEOUS at 12:27

## 2020-12-07 RX ADMIN — FUROSEMIDE 40 MG: 20 TABLET ORAL at 09:39

## 2020-12-07 RX ADMIN — IPRATROPIUM BROMIDE AND ALBUTEROL SULFATE 1 AMPULE: .5; 3 SOLUTION RESPIRATORY (INHALATION) at 15:25

## 2020-12-07 RX ADMIN — IPRATROPIUM BROMIDE AND ALBUTEROL SULFATE 1 AMPULE: .5; 3 SOLUTION RESPIRATORY (INHALATION) at 08:11

## 2020-12-07 RX ADMIN — SODIUM CHLORIDE, PRESERVATIVE FREE 10 ML: 5 INJECTION INTRAVENOUS at 21:53

## 2020-12-07 RX ADMIN — PREDNISONE 20 MG: 20 TABLET ORAL at 09:47

## 2020-12-07 RX ADMIN — ASPIRIN 81 MG: 81 TABLET, COATED ORAL at 09:37

## 2020-12-07 RX ADMIN — CARVEDILOL 3.12 MG: 3.12 TABLET, FILM COATED ORAL at 09:37

## 2020-12-07 RX ADMIN — INSULIN LISPRO 4 UNITS: 100 INJECTION, SOLUTION INTRAVENOUS; SUBCUTANEOUS at 09:39

## 2020-12-07 RX ADMIN — SODIUM CHLORIDE, PRESERVATIVE FREE 10 ML: 5 INJECTION INTRAVENOUS at 09:36

## 2020-12-07 RX ADMIN — MEGESTROL ACETATE 40 MG: 40 SUSPENSION ORAL at 21:48

## 2020-12-07 RX ADMIN — DILTIAZEM HYDROCHLORIDE 360 MG: 180 CAPSULE, COATED, EXTENDED RELEASE ORAL at 09:38

## 2020-12-07 RX ADMIN — APIXABAN 5 MG: 5 TABLET, FILM COATED ORAL at 09:39

## 2020-12-07 RX ADMIN — INSULIN LISPRO 3 UNITS: 100 INJECTION, SOLUTION INTRAVENOUS; SUBCUTANEOUS at 21:48

## 2020-12-07 RX ADMIN — DOCUSATE SODIUM 100 MG: 100 CAPSULE, LIQUID FILLED ORAL at 09:36

## 2020-12-07 ASSESSMENT — PAIN SCALES - GENERAL
PAINLEVEL_OUTOF10: 0

## 2020-12-07 ASSESSMENT — ENCOUNTER SYMPTOMS
COUGH: 0
BLOOD IN STOOL: 0
SHORTNESS OF BREATH: 0
NAUSEA: 0
WHEEZING: 0
ABDOMINAL PAIN: 0
CONSTIPATION: 0
DIARRHEA: 0
VOMITING: 0
CHEST TIGHTNESS: 0

## 2020-12-07 ASSESSMENT — PAIN DESCRIPTION - PROGRESSION
CLINICAL_PROGRESSION: NOT CHANGED

## 2020-12-07 NOTE — PLAN OF CARE
Pt taking aerosol TX as ordered. BS diminished and clear no distress noted. Pt refusing to wear hospital BiPAP, trying to have her unit brought in by family.  Spo2 95% on 2lpm.

## 2020-12-07 NOTE — PROGRESS NOTES
Occupational Therapy  Facility/Department: [facility]  Daily Treatment Note  NAME: Thor Shin  : 1950  MRN: [id number]    Date of Service: 2020    Discharge Recommendations:  Patient would benefit from continued therapy after discharge       Assessment   Performance deficits / Impairments: Decreased functional mobility ; Decreased safe awareness;Decreased balance;Decreased ADL status; Decreased endurance;Decreased strength;Decreased sensation  Comments: Pt unsafe to return to prior living arrangements at this time and will require continued therapy services while hospitalized and at discharge. Prognosis: Fair  Decision Making: Medium Complexity  OT Education: OT Role;Transfer Training;Equipment;Plan of Care(Activity Promotion- Importance of EOB/OOB Activity and Active Participation in Mobility/ADLs; good return)  REQUIRES OT FOLLOW UP: Yes  Activity Tolerance  Activity Tolerance: Patient limited by pain; Patient limited by fatigue  Safety Devices  Safety Devices in place: Yes  Type of devices: Call light within reach;Nurse notified; Left in bed;Bed alarm in place  Restraints  Initially in place: No         Patient Diagnosis(es): The primary encounter diagnosis was Pneumonia due to organism. Diagnoses of Congestive heart failure, unspecified HF chronicity, unspecified heart failure type ([facility] [facility] Hypoxia, and Elevated troponin were also pertinent to this visit. has a past medical history of CHF (congestive heart failure) ([facility] [address] CKD (chronic kidney disease), COPD (chronic obstructive pulmonary disease) ([facility] [address] Diabetes mellitus ([facility] [address] Hyperlipidemia, Hypertension, and Vertigo. has no past surgical history on file.     Restrictions  Restrictions/Precautions  Restrictions/Precautions: Fall Risk, Bedrest with Bathroom Privileges  Required Braces or Orthoses?: No  Position Activity Restriction  Other position/activity restrictions: Bedrest with bedside commode     Subjective General  Chart Reviewed: Yes, Orders, Progress Notes, History and Physical, Imaging, Labs  Patient assessed for rehabilitation services?: Yes  Response to previous treatment: Patient reporting fatigue but able to participate  Family / Caregiver Present: No  General Comment  Comments: RN ok'd for therapy this PM. Pt agreeable to participate in session and cooperative throughout. Vital Signs  Patient Currently in Pain: Denies     Orientation  Orientation  Overall Orientation Status: Within Functional Limits     Objective     Balance  Sitting Balance: Stand by assistance(seated EOB ~10 minutes)  Standing Balance  Time: ~10 seconds  Activity: static standing in nikhil stedy  Comment: max A x2 with high reliance on nikhil stedy frame     Bed mobility  Supine to Sit: Moderate assistance;2 Person assistance  Scooting: Moderate assistance;2 Person assistance  Comment: HOB raised ~45* and use of hand hand rail/therapist hand; assist x1 for trunk progression, assist x1 for BLE progression. Significant increased time/effort required to perform. Transfers  Transfer Comments: Attempted 1x with use of RW and max A x3 however pt unable to tolerate due to LLE/foot pain therefore attempted pt provided nikhil stedy. Pt performed sit <> stand transfer with use of nikhil stedy and max A x2 as well as dependently transferred from bed > chair with use of nikhil stedy. Pt required mod VCs for proper hand placement/sequencing and safety awareness throughout. Pt with poor tolerance to transfers due to significant increased LLE/foot pain with weight bearing.          Cognition  Overall Cognitive Status: [facility]         Plan   Plan  Times per week: 5-6x/wk  Times per day: Daily  Current Treatment Recommendations: Strengthening, Endurance Training, Patient/Caregiver Education & Training, Equipment Evaluation, Education, & procurement, Self-Care / ADL, Balance Training, Functional Mobility Training, Safety Education & Training      Goals  Short term goals  Time Frame for Short term goals: 14 visits  Short term goal 1: Pt will demo good safety awareness MOD IND throughout ADLs and functional transfers/mobility  Short term goal 2: Pt will perform functional transfers/mobility with MOD A and use of LRD  Short term goal 3: Pt will perform UB ADLs with SBA and use of AE/DME PRN  Short term goal 4: Pt will perform LB ADLs with MIN A and use of AE/DME PRN  Short term goal 5: Pt will demo 10+ minutes activity tolerance with use of ECWS techniques for increased ADL participation       Therapy Time   Individual Concurrent Group Co-treatment   Time In 1433         Time Out 1500         Minutes 27         Timed Code Treatment Minutes: 13 Minutes       Gauri Hollingsworth, OTR/L

## 2020-12-07 NOTE — CONSULTS
opacities favor atelectasis  A ct was not completed r/t creatine of 2.4. a VQ scan scan this morning was Indeterminate for pulmonary embolism. After obtaining her Echo that showed Leftward compression of inter-ventricular septum (\"D-sign\") indicating RV pressure overload. False tendon seen the apex of the heart we started her high intensity heparin protocol. Eliquis on hold for now. Pulmonary consulted.      This morning after I assessed the patient I ordered lasix 40 mg IV and started her on solumedrol. She diuresed over a liter and her RR and pulsox improved. bp has been marginal after the lasix but her map > 60. Previous Cardiology Office notes from [facility]:  1. Essential hypertension    2. Paroxysmal A-fib (CMS-HCC)    3. Atrial flutter, unspecified type (CMS-HCC)    4. BMI 50.0-59.9, adult (CMS-[facility])    1. Persistent atypical atrial flutter/atrial tachycardia, H/O atrial fibrillation with RVR on chronic anticoagulation with Eliquis  2. Preserved LV systolic function EF 49-38% per TTE 05/2020  3. Mild aortic stenosis  4. Moderate mitral regurgitation  5. Chronic HFpEF, NYHA III, well compensated   6. H/O metastatic breast CA on palliative chemotherapy  7. History remote brain aneurysm  7. Chronic endometriosis  8. Chronic lower extremity lymphedema  9. Obstructive sleep apnea  10. COPD  6. Current Every Day 2 pack per day smoker-attempting to quit  12. Spinal stenosis limited mobility in wheelchair  13. Chronic coccyx wound follows with wound care clinic  14. Essential hypertension, BP well controlled        Past Medical History:   has a past medical history of CHF (congestive heart failure) ([facility] [address] CKD (chronic kidney disease), COPD (chronic obstructive pulmonary disease) ([facility] [address] Diabetes mellitus ([facility] [address] Hyperlipidemia, Hypertension, and Vertigo. Past Surgical History:   has no past surgical history on file.      Home Medications:    Prior to Admission medications    Medication Sig Start Date End Date Taking? Authorizing Provider   apixaban (ELIQUIS) 5 MG TABS tablet Take 5 mg by mouth 2 times daily   Yes Historical Provider, MD   aspirin 81 MG EC tablet Take 81 mg by mouth daily   Yes Historical Provider, MD   Biotin 1 MG CAPS Take 1,000 mcg by mouth   Yes Historical Provider, MD   denosumab (XGEVA) 120 MG/1.7ML SOLN SC injection Inject 120 mg into the skin every 30 days Given on the 16th of the month subq   Yes Historical Provider, MD   dilTIAZem (DILACOR XR) 240 MG extended release capsule Take 360 mg by mouth daily   Yes Historical Provider, MD   FULVESTRANT IM Inject 500 mg into the muscle every 30 days On the 16th of the month   Yes Historical Provider, MD   latanoprost (XALATAN) 0.005 % ophthalmic solution Place 1 drop into both eyes nightly   Yes Historical Provider, MD   megestrol (MEGACE) 40 MG tablet Take 40 mg by mouth 2 times daily   Yes Historical Provider, MD   metFORMIN (GLUCOPHAGE) 500 MG tablet Take 500 mg by mouth 2 times daily (with meals)   Yes Historical Provider, MD   omeprazole (PRILOSEC) 20 MG delayed release capsule Take 20 mg by mouth daily   Yes Historical Provider, MD   oxyCODONE-acetaminophen (PERCOCET) 5-325 MG per tablet Take 1 tablet by mouth every 6 hours as needed for Pain.    Yes Historical Provider, MD   triamcinolone (KENALOG) 0.025 % cream Apply topically 2 times daily as needed Apply Topically   Yes Historical Provider, MD       furosemide, 40 mg, Oral, Daily    docusate sodium, 100 mg, Oral, Daily    predniSONE, 20 mg, Oral, Daily **FOLLOWED BY** [START ON 12/10/2020] predniSONE, 15 mg, Oral, Daily **FOLLOWED BY** [START ON 12/13/2020] predniSONE, 10 mg, Oral, Daily **FOLLOWED BY** [START ON 12/16/2020] predniSONE, 5 mg, Oral, Daily    dilTIAZem, 360 mg, Oral, Daily    insulin lispro, 0-12 Units, Subcutaneous, TID WC    insulin lispro, 0-6 Units, Subcutaneous, Nightly    ipratropium-albuterol, 1 ampule, Inhalation, Q4H WA    carvedilol, 3.125 mg, Oral, BID    sodium chloride flush, 10 mL, Intravenous, 2 times per day    aspirin, 81 mg, Oral, Daily    latanoprost, 1 drop, Both Eyes, Nightly    megestrol, 40 mg, Oral, BID    pantoprazole, 40 mg, Oral, QAM AC    sodium chloride flush, 10 mL, Intravenous, Once    apixaban, 5 mg, Oral, BID      Allergies:  Ciprofloxacin    Social History:   reports that she has never smoked. She has never used smokeless tobacco. She reports previous alcohol use. She reports that she does not use drugs. Family History: family history is not on file. No h/o sudden cardiac death. REVIEW OF SYSTEMS:    · Constitutional: there has been no unanticipated weight loss. There's been No change in energy level, No change in activity level. · Eyes: No visual changes or diplopia. No scleral icterus. · ENT: No Headaches, hearing loss or vertigo. No mouth sores or sore throat. · Cardiovascular: per HPI  · Respiratory: per HPI  · Gastrointestinal: No abdominal pain, appetite loss, blood in stools. No change in bowel or bladder habits. · Genitourinary: No dysuria, trouble voiding, or hematuria. · Musculoskeletal:  No gait disturbance, No weakness or joint complaints. · Integumentary: No rash or pruritis. · Neurological: No headache, diplopia, change in muscle strength, numbness or tingling. No change in gait, balance, coordination, mood, affect, memory, mentation, behavior. · Psychiatric: No anxiety, or depression. · Endocrine: No temperature intolerance. No excessive thirst, fluid intake, or urination. No tremor. · Hematologic/Lymphatic: No abnormal bruising or bleeding, blood clots or swollen lymph nodes. · Allergic/Immunologic: No nasal congestion or hives.       PHYSICAL EXAM:      /79   Pulse 104   Temp 97.9 °F (36.6 °C) (Oral)   Resp 18   Ht 5' 7.5\" (1.715 m)   Wt 266 lb (120.7 kg)   SpO2 94%   BMI 41.05 kg/m²    Constitutional and General Appearance: alert, cooperative, no distress and appears stated age  [de-identified]: PERRL, no cervical lymphadenopathy. No masses palpable. Normal oral mucosa  Respiratory:  · Normal excursion and expansion without use of accessory muscles  · Resp Auscultation: Good respiratory effort. No for increased work of breathing. On auscultation: clear to auscultation bilaterally  Cardiovascular:  · The apical impulse is not displaced  · Heart tones are crisp and normal. regular S1 and S2.  · Jugular venous pulsation Normal  · The carotid upstroke is normal in amplitude and contour without delay or bruit  · Peripheral pulses are symmetrical and full   Abdomen:   · No masses or tenderness  · Bowel sounds present  Extremities:  ·  No Cyanosis or Clubbing  ·  Lower extremity edema: No  ·  Skin: Warm and dry  Neurological:  · Alert and oriented. · Moves all extremities well  · No abnormalities of mood, affect, memory, mentation, or behavior are noted        EKG:    Date: 12/07/20  Reading: No acute ischemia      LAST ECHO:  Date: 12/3/20  Findings Summary:  Left ventricle is dilated. Global left ventricular systolic function is  moderate to severely reduced Estimated ejection fraction is 30-35 %. Mild left ventricular hypertrophy. Leftward compression of inter-ventricular septum (\"D-sign\") indicating RV  pressure overload. False tendon seen the apex of the heart. Left atrium is severely dilated. Inter-atrial septum appears so be intact. Right atrium is severely dilated . Severely dilated right ventricular cavity. Reduced right ventricular function. Aortic leaflet calcification with mild stenosis. Moderate aortic insufficiency. Aortic valve is trileaflet. Normal mitral valve structure and function. Mitral annular calcification is seen. Mild-moderate mitral regurgitation. No mitral stenosis. Normal tricuspid valve structure and function. Mild to moderate tricuspid regurgitation. Estimated right ventricular systolic pressure is 70.22 mmHg.   Mild pulmonary hypertension, May be underestimated due to direction of  regurgitant jet. The pulmonic valve is normal in structure. Mild pulmonic insufficiency. No evidence of pulmonic stenosis. Trivial pericardial effusion. No signs of cardiac tamponade. Left pleural effusion. Aortic root is mildly dilated and ascending aorta is moderately dilated  measuring 4.6 cm  IVC Increased diameter and impaired or no inspiratory variation indicating  elevated RA filling pressure (i.e. CVP) . E/E' average = 11.75. LAST Stress Test:   Date of last ST:  Major Findings:    LAST Cardiac Angiography:.  Date:  Findings:          Labs:     CBC:   Recent Labs     12/06/20 0728 12/07/20 0623   WBC 7.8 7.1   HGB 12.4 12.4   HCT 38.6 38.6    133*     BMP:   Recent Labs     12/06/20 0728 12/07/20 0623    138   K 3.8 3.9   CO2 41* 42*   BUN 41* 43*   CREATININE 1.12* 1.12*   LABGLOM 48* 48*   GLUCOSE 258* 224*     BNP: No results for input(s): BNP in the last 72 hours. PT/INR: No results for input(s): PROTIME, INR in the last 72 hours. APTT:  Recent Labs     12/04/20  1559   APTT 29.4     CARDIAC ENZYMES:No results for input(s): CKTOTAL, CKMB, CKMBINDEX, TROPONINI in the last 72 hours. FASTING LIPID PANEL:  Lab Results   Component Value Date    HDL 30 12/05/2020    TRIG 68 12/05/2020     LIVER PROFILE:No results for input(s): AST, ALT, LABALBU in the last 72 hours.   Troponins: Invalid input(s): TROPONIN     Other Current Problems  Patient Active Problem List   Diagnosis    Hypoxia    Chronic obstructive pulmonary disease with acute exacerbation (HCC)    Hypertension    Malignant neoplasm of bone (HCC)    Neuropathy    Paroxysmal atrial fibrillation (HCC)    Atypical atrial flutter (HCC)    Sleep apnea    CKD (chronic kidney disease)    Type 2 diabetes mellitus, without long-term current use of insulin (HCC)    Acute on chronic combined systolic and diastolic congestive heart failure ([facility] [address]    Pneumonia due to infectious organism    Acute on chronic respiratory failure ([facility] [address]    Pneumonia due to organism           IMPRESSION & Recommendations:      1. New drop in EF from 50-55% in May 2020 to 30-35% yesterday. Will need cath. Reviewed [facility] cardiology notes for last echo results. Will need transfer to [address]. Signs of elevated pressure in right and left on echo. 2. Persistent Afib/Flutter- no cardizem, given low EF. Will optimize meds. 3. Afib c RVR- HR still 100-120bpm at rest. Add amio PO  4. Mild AS- stable  5. COPD- stable  6. BL infiltrates/PNA- on abx and treatment, improving  7. Type 2 MI from hypoxia, CKD. Trop as high as 70s  8. FINA- stable  9. HTN- stable      Discussed with patient, family, and Nurse. Electronically signed by Nadeen Barraza DO on 12/7/2020 at 1:28 PM    Nadeen Barraza, [id number] [facility]  [facility]  [phone]

## 2020-12-07 NOTE — PROGRESS NOTES
Nutrition Education    · Verbally reviewed information with Patient  · Educated on Diabetes meal planning;carbohydrate controlled, low salt diet with emphasis on good nutrition to help with healing and good blood sugar control. · Pt declined written educational materials. · Refer to Patient Education activity for more details.     Electronically signed by Miriam Fuentes RD, LD on 12/7/20 at 10:47 AM GUSTAVO Fuentes RD,LD  Clinical Dietitian  [facility]  [phone]

## 2020-12-07 NOTE — PROGRESS NOTES
Physical Therapy  Facility/Department: [facility] MED SURG ICU  Daily Treatment Note  NAME: Chadwick Yoder  : 1950  MRN: [id number]    Date of Service: 2020    Discharge Recommendations:  Patient would benefit from continued therapy after discharge   PT Equipment Recommendations  Equipment Needed: No    Assessment   Body structures, Functions, Activity limitations: Decreased functional mobility ; Decreased strength;Decreased endurance;Decreased balance  Assessment: Pt displays with impaired mobility that is improving; pt performed bed mobility with Mod A x2 this session. Pt desired to perform sit <> stand with RW and attempted transfer twice, but was unable to complete it at this time; pt transferred using the Konstantin Pea with Max A x2. Pt at this time would be unsafe to return to her prior living arrangement due to requiring 2 person max assistance; pt would benefit from further therapy upon discharge. Prognosis: Good  Decision Making: Medium Complexity  PT Education: Goals;PT Role;Plan of Care;Transfer Training;Functional Mobility Training  Patient Education: Importance of mobility and up to chair  REQUIRES PT FOLLOW UP: Yes  Activity Tolerance  Activity Tolerance: Patient limited by endurance; Patient limited by fatigue     Patient Diagnosis(es): The primary encounter diagnosis was Pneumonia due to organism. Diagnoses of Congestive heart failure, unspecified HF chronicity, unspecified heart failure type ([address] Hypoxia, and Elevated troponin were also pertinent to this visit. has a past medical history of CHF (congestive heart failure) ([address] CKD (chronic kidney disease), COPD (chronic obstructive pulmonary disease) ([address] Diabetes mellitus ([facility] [address] Hyperlipidemia, Hypertension, and Vertigo. has no past surgical history on file.     Restrictions  Restrictions/Precautions  Restrictions/Precautions: General Precautions, Fall Risk  Required Braces or Orthoses?: No  Position Activity Restriction  Other position/activity restrictions: Bedrest with bedside commode  Subjective   General  Response To Previous Treatment: Patient with no complaints from previous session. Family / Caregiver Present: No  Subjective  Subjective: Pt supine in bed and unagreeable to therapy in the morning; pt requested therapy return in the pm; pt agreeable to therapy in the pm; RN agreeable to therapy. Pt reports not pain at rest, but has pain in LLE with movement. Pain Screening  Patient Currently in Pain: Denies  Vital Signs  Patient Currently in Pain: Denies       Orientation  Orientation  Overall Orientation Status: Within Functional Limits  Cognition   Cognition  Overall Cognitive Status: WFL  Objective   Bed mobility  Supine to Sit: Moderate assistance;2 Person assistance  Sit to Supine: Unable to assess(Pt retired to chair)  Scooting: Moderate assistance;2 Person assistance  Comment: With HOB raised ~45*, required hand held assistance for UE and trunk as well as LLE assistance. Pt performed static sitting EOB ~10 minutes. Transfers  Sit to Stand: Maximum Assistance;2 Person Assistance  Stand to sit: Maximum Assistance;2 Person Assistance  Comment: Pt attempted to stand with RW x2, but was unable to perform it at this time; Pt performed transfer with Faraz Kameron; able to stand within Faraz Kameron twice first for ~30-45 seconds and second for ~10 seconds.   Ambulation  Ambulation?: No  Stairs/Curb  Stairs?: No     Balance  Posture: Fair  Sitting - Static: Fair  Sitting - Dynamic: Fair;-  Standing - Static: Poor  Comments: standing balance assessed in nikhil barbra                          Goals  Short term goals  Time Frame for Short term goals: 14 visits  Short term goal 1: Pt to ambulate 10ft min A RW to allow for improving independence with mobility  Short term goal 2: Pt to sit <> stand transfer min A to allow for improving independence with mobility  Short term goal 3: Pt to demonstrate min A bed mobility to allow for improving independence with mobility  Short term goal 4: Pt to demonstrate fair standing and good - seated balance to decrease risk of falls  Short term goal 5: Pt to tolerate 30 minutes of therapy for endurance  Patient Goals   Patient goals : Pt would like to feel better    Plan    Plan  Times per week: 5-6x  Times per day: Daily  Current Treatment Recommendations: Strengthening, Balance Training, Functional Mobility Training, Transfer Training, Endurance Training, Gait Training, Patient/Caregiver Education & Training, Home Exercise Program, Safety Education & Training, Positioning  Safety Devices  Type of devices: Call light within reach, Gait belt, Left in chair, Nurse notified, Patient at risk for falls  Restraints  Initially in place: No     Therapy Time   Individual Concurrent Group Co-treatment   Time In 1433         Time Out 1459         Minutes 26         Timed Code Treatment Minutes: 10 Minutes   Co-treatment with OT. Brianda Lynn SPT  Evaluation/treatment performed by Student PT under the supervision of co-signing PT who agrees with all evaluation/treatment and documentation.

## 2020-12-07 NOTE — PLAN OF CARE
I was just informed of the note Dr. Daniel Juares wrote requesting transfer to [facility] for a heart cath. [facility] has no beds and they are on bypass. Access checking with [facility]. They do have bed. Checking with Dr Daniel Juares prior to transfer. Dr Daniel Juares ok's transfer to [facility]. Plan to speak with one of our partners about tranfer.

## 2020-12-07 NOTE — PLAN OF CARE
Noncompressibility of the distal femoral vein and partial compressibility of the popliteal vein suggesting underlying thrombosis. I spoke with the radiologist and he said without previous studies its difficult to say wether or not this acute or chronic. I spoke with Zuri Martinez at [facility] cardiology In [place]. Per the report she read the patient had an acute popliteal thrombosis per doppler study on 4/10/2018.  Report to follow

## 2020-12-07 NOTE — PROGRESS NOTES
[facility]  Office: [address], DO, Corado Antonylianna, DO, Mancilla Shown, DO, Bobbypatt Ramirezies Blood, DO, Roe Link MD, Pietro Acevedo MD, Juan Ramirez MD, Joelle Rogers MD, Aldo Lopez MD, Stevan Ramirez MD, Reno Alvarado MD, Honey Knox MD, Ernestina Joe MD, Trupti Singh, DO, Dulce Marquez MD, Kiersten Roca MD, Socorro James DO, Marleni Durand MD,  Cesar Newsome, DO, Brett Gross MD, Zee Recio MD, Praveena Kirby [facility], [facility], CNP, Jammie Carvajal, CNP, Alcira Smith, CNS, Lauri Watt, CNP, Jasmyn Styles, CNP, Chino Birch, CNP, Tatum Moreno, CNP, Brenna Padilla, CNP, Lu Nice PA-C, Iraj Gil, [facility], Jacqui Salinas, CNP, Morgan Sinha, CNP, Florentin Biggs, CNP, Zenaida Cardoso, CNP, Amos Joiner [address]442    Progress Note    12/7/2020    8:53 AM    Name:   Chris Ortiz  MRN:     [id number]     Acct:      [de-identified]   Room:   [address] [place] [address] Day:  3  Admit Date:  12/3/2020 10:16 PM    PCP:   Celina Miranda MD  Code Status:  Full Code    Subjective:     C/C:   Chief Complaint   Patient presents with    Shortness of Breath     70s per ems from nursing home     Interval History Status: improved. She states she feels good. She is calling asking friends a family to sneak her in pie and other foods. She denies feeling sob. She states her left leg even feels better. She is concerned about an appt with ID today regarding the wound on her chin and she is asking me find out about it. Brief History:     Per previous documentation    Chris Ortiz is a 78 y.o. Non-/non  female who presents with Shortness of Breath (70s per ems from nursing home)   and is admitted to the hospital for the management of Hypoxia.     The patient was brought to the ER per EMS related to hypoxia. The patient states she was just discharged to a facilty a hospitalization in [facility]. She presented there because of pain in her left foot, frequent falls, and weakness. She was found to be in A fib with RVR. A chest xray showed right pleural effusion and atelectasis or infiltrate. BNP during that visit less than 900. She was discharged on bumex. According the ER note the patients sats were in the 70's at the facility. She denies feeling particularly sob. She denies fever, chills, N/V. No chest pain or palpitations. She does have chronic wounds to her chin and buttocks that she follows with wound clinic. All of her doctors are from [facility] including Dr Rae Lipscomb, Dr Deysi Alfaro and Dr Adelina Estevez. She does continue to have monthly injections per oncology for bone mets. She states she wears 2 l nc through her bipap. The patient states she is compliant with hr medications.      In the ER she was placed on highflo, started on Rocephin and Zithromax IV, Lovenox at 0.5 mg/kg. CXR showsCardiomegaly with perihilar congestion. Bilateral effusions and airspace opacities favor atelectasis  A ct was not completed r/t creatine of 2.4. a VQ scan scan this morning was Indeterminate for pulmonary embolism. After obtaining her Echo that showed Leftward compression of inter-ventricular septum (\"D-sign\") indicating RV pressure overload. False tendon seen the apex of the heart we started her high intensity heparin protocol. Eliquis on hold for now. Pulmonary consulted. Per Dr Callahan Rounds to restart eliquis and dc heparin gtt from pulmonary standpoint if no objection by cardiology in light of valvular Afib . Dc antibiotics - not pneumonia. Per Dr. Corrinne Rickers LVEF on Cardizem will DC at this time, plan to start on Coreg once the blood pressure is improved. Ok to stop heparin and resume Eliquis. will continue Lasix. Cardizem and coreg restarted. Lasix decreased to 40mg po daily. Planning for discharge soon pending cardiology and pulmonary recommendations.        Review of Systems:     Review of Systems   Constitutional: Negative for chills, diaphoresis and fever. HENT: Negative for congestion. Eyes: Negative for visual disturbance. Respiratory: Negative for cough, chest tightness, shortness of breath and wheezing. Cardiovascular: Negative for chest pain, palpitations and leg swelling. Gastrointestinal: Negative for abdominal pain, blood in stool, constipation, diarrhea, nausea and vomiting. Genitourinary: Negative for difficulty urinating. Skin: Positive for wound. Neurological: Negative for dizziness, weakness, light-headedness, numbness and headaches. All other systems reviewed and are negative. Medications: Allergies:     Allergies   Allergen Reactions    Ciprofloxacin        Current Meds:   Scheduled Meds:    furosemide  40 mg Oral Daily    docusate sodium  100 mg Oral Daily    dilTIAZem  360 mg Oral Daily    insulin lispro  0-12 Units Subcutaneous TID WC    insulin lispro  0-6 Units Subcutaneous Nightly    methylPREDNISolone  40 mg Intravenous Daily    ipratropium-albuterol  1 ampule Inhalation Q4H WA    carvedilol  3.125 mg Oral BID WC    sodium chloride flush  10 mL Intravenous 2 times per day    aspirin  81 mg Oral Daily    latanoprost  1 drop Both Eyes Nightly    megestrol  40 mg Oral BID    pantoprazole  40 mg Oral QAM AC    sodium chloride flush  10 mL Intravenous Once    apixaban  5 mg Oral BID     Continuous Infusions:    dextrose       PRN Meds: magnesium sulfate, potassium chloride **OR** potassium alternative oral replacement **OR** potassium chloride, acetaminophen **OR** acetaminophen, promethazine **OR** ondansetron, albuterol, sodium chloride flush, polyethylene glycol, oxyCODONE-acetaminophen, glucose, dextrose, glucagon (rDNA), dextrose    Data:     Past Medical History:   has a past medical history of CHF (congestive heart failure) ([facility] CKD (chronic kidney disease), COPD (chronic obstructive pulmonary disease) ([facility] Diabetes mellitus [facility] Hyperlipidemia, Hypertension, and Vertigo. Social History:   reports that she has never smoked. She has never used smokeless tobacco. She reports previous alcohol use. She reports that she does not use drugs. Family History: History reviewed. No pertinent family history. Vitals:  BP (!) 104/59   Pulse 102   Temp 98.9 °F (37.2 °C) (Oral)   Resp 15   Ht 5' 7.5\" (1.715 m)   Wt 266 lb (120.7 kg)   SpO2 96%   BMI 41.05 kg/m²   Temp (24hrs), Av.9 °F (37.2 °C), Min:98.5 °F (36.9 °C), Max:99.1 °F (37.3 °C)    Recent Labs     20  1210 20  1634 20   POCGLU 205* 230* 301* 249*       I/O (24Hr):     Intake/Output Summary (Last 24 hours) at 2020 0853  Last data filed at 2020 6681  Gross per 24 hour   Intake 600 ml   Output 1750 ml   Net -1150 ml       Labs:  Hematology:  Recent Labs     20   WBC 4.6 7.8 7.1   RBC 3.87* 4.03 3.99*   HGB 11.8* 12.4 12.4   HCT 37.4 38.6 38.6   MCV 96.4 95.8 96.6   MCH 30.6 30.9 31.0   MCHC 31.7 32.2 32.1   RDW 15.8* 16.1* 16.2*   * 144 133*   MPV 10.0 9.8 9.6     Chemistry:  Recent Labs     20  0914 20  0537 20  1501 20  0728 20  06    141  --   --  136 138   K 4.6 3.6*  --  3.4* 3.8 3.9   CL 99 95*  --   --  88* 91*   CO2 36* 37*  --   --  41* 42*   GLUCOSE 128* 243*  --   --  258* 224*   BUN 54* 44*  --   --  41* 43*   CREATININE 2.07* 1.34*  --   --  1.12* 1.12*   MG 1.9 1.6 1.5*  --  1.8  --    ANIONGAP 6* 9  --   --  7* 5*   LABGLOM 24* 39*  --   --  48* 48*   GFRAA 29* 48*  --   --  59* 59*   CALCIUM 10.1 9.6  --   --  9.9 9.7   PROBNP  --  10,338*  --   --   --  6,225*   TROPHS 61*  --   --   --   --   --      Recent Labs     20  0537  20  1214 20  1706 20  1957 20  1210 20  1634 20  2011   TSH 0.14*  --   --   --   --   --   --   --    CHOL 80  --   --   --   --   --   --   --    HDL 30*  --   -- is warm and dry. Capillary Refill: Capillary refill takes less than 2 seconds. Comments: Open area to chin is dry and crusted   Neurological:      General: No focal deficit present. Mental Status: She is alert. GCS: GCS eye subscore is 4. GCS verbal subscore is 5. GCS motor subscore is 6. Sensory: Sensation is intact. Psychiatric:         Mood and Affect: Mood normal.         Behavior: Behavior is cooperative. Assessment:     Hospital Problems           Last Modified POA    * (Principal) Hypoxia 12/4/2020 Yes    Chronic obstructive pulmonary disease with acute exacerbation ([address] 12/4/2020 Yes    Hypertension 12/4/2020 Yes    Paroxysmal atrial fibrillation ([address] 12/4/2020 Yes    Sleep apnea 12/4/2020 Yes    CKD (chronic kidney disease) 12/4/2020 Yes    Type 2 diabetes mellitus, without long-term current use of insulin ([facility] 12/4/2020 Yes    Acute on chronic combined systolic and diastolic congestive heart failure ([facility] [address] 12/4/2020 Yes    Pneumonia due to infectious organism 12/4/2020 Yes    Acute on chronic respiratory failure ([address] 12/4/2020 Yes    Overview Signed 12/4/2020  4:51 PM by EMY Otero - CNP     2 l nc through bipap at night         Pneumonia due to organism 12/4/2020 Yes          Plan:     Acute on chronic respiratory failure with hypoxia; pulmonary medicine consulted. Continue high flow pending their recommendations. Wean high flow - keep SATs 88-92 %   Use BiPAP at night keep SATs 88-92 %   Solu-Medrol IV daily, respiratory treatments as ordered. Dc antibiotics - not pneumonia      Acute on chronic systolic and diastolic heart failure; echo completed. Cardiology and pulmonary reviewed Echo they believe CHF . Lasix 40 mg IV changed to po daily. Cardiology following. BNP every other day. Cardizem resumed     LYSSA; patient has a history of CKD; avoid nephrotoxic agents.   Monitor BMP     Pneumonia; ruled out     Type 2 diabetes; insulin sliding scale for glucose management.   Hold Glucophage for now related to LYSSA     A. fib; resume Eliquis at this time.      Mechanical DVT prophylaxis for now     PPI     Pain meds for complaints of chronic left leg pain     PT OT to evaluate and treat      Continue to monitor I&O and telemetry.     Recommend congestive heart failure nurse outpatient    EMY Singh - CNP  12/7/2020  8:53 AM

## 2020-12-07 NOTE — PLAN OF CARE
Problem: Skin Integrity:  Goal: Will show no infection signs and symptoms  Description: Will show no infection signs and symptoms  Outcome: Ongoing  Goal: Absence of new skin breakdown  Description: Absence of new skin breakdown  Outcome: Ongoing   Pt skin remains intact, mepilex re enforced on buttocks, waffle mattress on bed and pt encouraged to turn q 2 hs. Will continue to monitor. Problem: Infection:  Goal: Will remain free from infection  Description: Will remain free from infection  Outcome: Ongoing   Pt VS will remain stable and Labs will return to normal.      Problem: Safety:  Goal: Free from accidental physical injury  Description: Free from accidental physical injury  Outcome: Ongoing  Goal: Free from intentional harm  Description: Free from intentional harm  Outcome: Ongoing   Pt remained free of falls, non skid socks are on, bed alarm on, call light within reach, and patient instructed to call for help when needed. Problem: Daily Care:  Goal: Daily care needs are met  Description: Daily care needs are met  Outcome: Ongoing   RN will assist pt with ADLs as needed. Problem: Pain:  Goal: Patient's pain/discomfort is manageable  Description: Patient's pain/discomfort is manageable  Outcome: Ongoing  Goal: Pain level will decrease  Description: Pain level will decrease  Outcome: Ongoing  Goal: Control of acute pain  Description: Control of acute pain  Outcome: Ongoing  Goal: Control of chronic pain  Description: Control of chronic pain  Outcome: Ongoing   Pt assessed for pain, non-pharm interventions used, pain meds given if needed, Pt reassesed for pain. Problem: Discharge Planning:  Goal: Patients continuum of care needs are met  Description: Patients continuum of care needs are met  Outcome: Ongoing   Pt  with verbalize and special needs or concerns prior to discharge.       Problem: Musculor/Skeletal Functional Status  Goal: Highest potential functional level  Outcome: Ongoing   Pt will improve strength and gate with use of adaptive equipment and Physical Therapy.

## 2020-12-07 NOTE — CARE COORDINATION
TRANSITIONAL CARE PLANNING/ 2 Re[facility] Day: 3    Reason for Admission: Hypoxia [R09.02]     Treatment Plan of Care: pulm - resp fail    Tests/Procedures still needed:    Barriers to Discharge: IV diuresis continues, O2 per NC ( ra at baseline)    Readmission Risk    Risk of Unplanned Readmission: 21        Patient goals/Treatment Preferences/Transitional Plan: return to [address] [place] [address]    Referrals Made: [address] [place] [address]    Follow Up needed: Medical clearance for SNF

## 2020-12-08 ENCOUNTER — HOSPITAL ENCOUNTER (INPATIENT)
Age: 70
LOS: 1 days | Discharge: NURSING, REHAB OR LONG-TERM CARE | DRG: 280 | End: 2020-12-09
Attending: INTERNAL MEDICINE | Admitting: INTERNAL MEDICINE
Payer: MEDICARE

## 2020-12-08 PROBLEM — E66.01 MORBID OBESITY (HCC): Status: ACTIVE | Noted: 2020-12-08

## 2020-12-08 PROBLEM — I21.A1 TYPE 2 MI (MYOCARDIAL INFARCTION) (HCC): Status: ACTIVE | Noted: 2020-12-08

## 2020-12-08 LAB
ANION GAP SERPL CALCULATED.3IONS-SCNC: 6 MMOL/L (ref 9–17)
BUN BLDV-MCNC: 37 MG/DL (ref 8–23)
BUN/CREAT BLD: 43 (ref 9–20)
CALCIUM SERPL-MCNC: 9.3 MG/DL (ref 8.6–10.4)
CHLORIDE BLD-SCNC: 91 MMOL/L (ref 98–107)
CO2: 44 MMOL/L (ref 20–31)
CREAT SERPL-MCNC: 0.87 MG/DL (ref 0.5–0.9)
GFR AFRICAN AMERICAN: >60 ML/MIN
GFR NON-AFRICAN AMERICAN: >60 ML/MIN
GFR SERPL CREATININE-BSD FRML MDRD: ABNORMAL ML/MIN/{1.73_M2}
GFR SERPL CREATININE-BSD FRML MDRD: ABNORMAL ML/MIN/{1.73_M2}
GLUCOSE BLD-MCNC: 120 MG/DL (ref 65–105)
GLUCOSE BLD-MCNC: 139 MG/DL (ref 70–99)
GLUCOSE BLD-MCNC: 157 MG/DL (ref 65–105)
GLUCOSE BLD-MCNC: 210 MG/DL (ref 65–105)
GLUCOSE BLD-MCNC: 280 MG/DL (ref 65–105)
POTASSIUM SERPL-SCNC: 3.4 MMOL/L (ref 3.7–5.3)
SODIUM BLD-SCNC: 141 MMOL/L (ref 135–144)

## 2020-12-08 PROCEDURE — 82947 ASSAY GLUCOSE BLOOD QUANT: CPT

## 2020-12-08 PROCEDURE — 94640 AIRWAY INHALATION TREATMENT: CPT

## 2020-12-08 PROCEDURE — C1769 GUIDE WIRE: HCPCS

## 2020-12-08 PROCEDURE — 80048 BASIC METABOLIC PNL TOTAL CA: CPT

## 2020-12-08 PROCEDURE — B2111ZZ FLUOROSCOPY OF MULTIPLE CORONARY ARTERIES USING LOW OSMOLAR CONTRAST: ICD-10-PCS | Performed by: INTERNAL MEDICINE

## 2020-12-08 PROCEDURE — 2060000000 HC ICU INTERMEDIATE R&B

## 2020-12-08 PROCEDURE — 2709999900 HC NON-CHARGEABLE SUPPLY

## 2020-12-08 PROCEDURE — 2700000000 HC OXYGEN THERAPY PER DAY

## 2020-12-08 PROCEDURE — 6370000000 HC RX 637 (ALT 250 FOR IP): Performed by: NURSE PRACTITIONER

## 2020-12-08 PROCEDURE — 6360000002 HC RX W HCPCS

## 2020-12-08 PROCEDURE — 99239 HOSP IP/OBS DSCHRG MGMT >30: CPT | Performed by: NURSE PRACTITIONER

## 2020-12-08 PROCEDURE — C1887 CATHETER, GUIDING: HCPCS

## 2020-12-08 PROCEDURE — C1894 INTRO/SHEATH, NON-LASER: HCPCS

## 2020-12-08 PROCEDURE — 2580000003 HC RX 258: Performed by: NURSE PRACTITIONER

## 2020-12-08 PROCEDURE — 93458 L HRT ARTERY/VENTRICLE ANGIO: CPT | Performed by: INTERNAL MEDICINE

## 2020-12-08 PROCEDURE — APPSS180 APP SPLIT SHARED TIME > 60 MINUTES: Performed by: NURSE PRACTITIONER

## 2020-12-08 PROCEDURE — 4A023N7 MEASUREMENT OF CARDIAC SAMPLING AND PRESSURE, LEFT HEART, PERCUTANEOUS APPROACH: ICD-10-PCS | Performed by: INTERNAL MEDICINE

## 2020-12-08 PROCEDURE — 36415 COLL VENOUS BLD VENIPUNCTURE: CPT

## 2020-12-08 PROCEDURE — 2500000003 HC RX 250 WO HCPCS

## 2020-12-08 PROCEDURE — 6360000004 HC RX CONTRAST MEDICATION

## 2020-12-08 RX ORDER — SODIUM CHLORIDE 0.9 % (FLUSH) 0.9 %
10 SYRINGE (ML) INJECTION ONCE
Status: COMPLETED | OUTPATIENT
Start: 2020-12-08 | End: 2020-12-08

## 2020-12-08 RX ORDER — AMIODARONE HYDROCHLORIDE 200 MG/1
200 TABLET ORAL 2 TIMES DAILY
Status: DISCONTINUED | OUTPATIENT
Start: 2020-12-08 | End: 2020-12-09 | Stop reason: HOSPADM

## 2020-12-08 RX ORDER — ACETAMINOPHEN 650 MG/1
650 SUPPOSITORY RECTAL EVERY 6 HOURS PRN
Status: DISCONTINUED | OUTPATIENT
Start: 2020-12-08 | End: 2020-12-09 | Stop reason: HOSPADM

## 2020-12-08 RX ORDER — FUROSEMIDE 40 MG/1
40 TABLET ORAL DAILY
Status: DISCONTINUED | OUTPATIENT
Start: 2020-12-08 | End: 2020-12-09 | Stop reason: HOSPADM

## 2020-12-08 RX ORDER — OXYCODONE HYDROCHLORIDE AND ACETAMINOPHEN 5; 325 MG/1; MG/1
1 TABLET ORAL EVERY 6 HOURS PRN
Status: DISCONTINUED | OUTPATIENT
Start: 2020-12-08 | End: 2020-12-09 | Stop reason: HOSPADM

## 2020-12-08 RX ORDER — SODIUM CHLORIDE 0.9 % (FLUSH) 0.9 %
10 SYRINGE (ML) INJECTION EVERY 12 HOURS SCHEDULED
Status: DISCONTINUED | OUTPATIENT
Start: 2020-12-08 | End: 2020-12-08 | Stop reason: SDUPTHER

## 2020-12-08 RX ORDER — DOCUSATE SODIUM 100 MG/1
100 CAPSULE, LIQUID FILLED ORAL DAILY
Status: DISCONTINUED | OUTPATIENT
Start: 2020-12-08 | End: 2020-12-09 | Stop reason: HOSPADM

## 2020-12-08 RX ORDER — DEXTROSE MONOHYDRATE 50 MG/ML
100 INJECTION, SOLUTION INTRAVENOUS PRN
Status: DISCONTINUED | OUTPATIENT
Start: 2020-12-08 | End: 2020-12-09 | Stop reason: HOSPADM

## 2020-12-08 RX ORDER — ASPIRIN 81 MG/1
81 TABLET ORAL DAILY
Status: DISCONTINUED | OUTPATIENT
Start: 2020-12-08 | End: 2020-12-09 | Stop reason: HOSPADM

## 2020-12-08 RX ORDER — POTASSIUM CHLORIDE 20 MEQ/1
40 TABLET, EXTENDED RELEASE ORAL PRN
Status: DISCONTINUED | OUTPATIENT
Start: 2020-12-08 | End: 2020-12-09 | Stop reason: HOSPADM

## 2020-12-08 RX ORDER — DEXTROSE MONOHYDRATE 25 G/50ML
12.5 INJECTION, SOLUTION INTRAVENOUS PRN
Status: DISCONTINUED | OUTPATIENT
Start: 2020-12-08 | End: 2020-12-09 | Stop reason: HOSPADM

## 2020-12-08 RX ORDER — PREDNISONE 20 MG/1
20 TABLET ORAL DAILY
Status: COMPLETED | OUTPATIENT
Start: 2020-12-08 | End: 2020-12-09

## 2020-12-08 RX ORDER — ONDANSETRON 2 MG/ML
4 INJECTION INTRAMUSCULAR; INTRAVENOUS EVERY 6 HOURS PRN
Status: DISCONTINUED | OUTPATIENT
Start: 2020-12-08 | End: 2020-12-09 | Stop reason: HOSPADM

## 2020-12-08 RX ORDER — PREDNISONE 1 MG/1
5 TABLET ORAL DAILY
Status: DISCONTINUED | OUTPATIENT
Start: 2020-12-16 | End: 2020-12-09 | Stop reason: HOSPADM

## 2020-12-08 RX ORDER — SODIUM CHLORIDE 0.9 % (FLUSH) 0.9 %
10 SYRINGE (ML) INJECTION PRN
Status: DISCONTINUED | OUTPATIENT
Start: 2020-12-08 | End: 2020-12-09 | Stop reason: HOSPADM

## 2020-12-08 RX ORDER — ACETAMINOPHEN 325 MG/1
650 TABLET ORAL EVERY 6 HOURS PRN
Status: DISCONTINUED | OUTPATIENT
Start: 2020-12-08 | End: 2020-12-09 | Stop reason: HOSPADM

## 2020-12-08 RX ORDER — POLYETHYLENE GLYCOL 3350 17 G/17G
17 POWDER, FOR SOLUTION ORAL DAILY PRN
Status: DISCONTINUED | OUTPATIENT
Start: 2020-12-08 | End: 2020-12-09 | Stop reason: HOSPADM

## 2020-12-08 RX ORDER — CARVEDILOL 25 MG/1
25 TABLET ORAL 2 TIMES DAILY WITH MEALS
Status: DISCONTINUED | OUTPATIENT
Start: 2020-12-08 | End: 2020-12-09

## 2020-12-08 RX ORDER — SODIUM CHLORIDE 0.9 % (FLUSH) 0.9 %
10 SYRINGE (ML) INJECTION PRN
Status: DISCONTINUED | OUTPATIENT
Start: 2020-12-08 | End: 2020-12-08 | Stop reason: SDUPTHER

## 2020-12-08 RX ORDER — ALBUTEROL SULFATE 2.5 MG/3ML
2.5 SOLUTION RESPIRATORY (INHALATION)
Status: DISCONTINUED | OUTPATIENT
Start: 2020-12-08 | End: 2020-12-09 | Stop reason: HOSPADM

## 2020-12-08 RX ORDER — MAGNESIUM SULFATE 1 G/100ML
1 INJECTION INTRAVENOUS PRN
Status: DISCONTINUED | OUTPATIENT
Start: 2020-12-08 | End: 2020-12-09 | Stop reason: HOSPADM

## 2020-12-08 RX ORDER — LATANOPROST 50 UG/ML
1 SOLUTION/ DROPS OPHTHALMIC NIGHTLY
Status: DISCONTINUED | OUTPATIENT
Start: 2020-12-08 | End: 2020-12-09 | Stop reason: HOSPADM

## 2020-12-08 RX ORDER — NICOTINE POLACRILEX 4 MG
15 LOZENGE BUCCAL PRN
Status: DISCONTINUED | OUTPATIENT
Start: 2020-12-08 | End: 2020-12-09 | Stop reason: HOSPADM

## 2020-12-08 RX ORDER — PREDNISONE 10 MG/1
10 TABLET ORAL DAILY
Status: DISCONTINUED | OUTPATIENT
Start: 2020-12-13 | End: 2020-12-09 | Stop reason: HOSPADM

## 2020-12-08 RX ORDER — POTASSIUM CHLORIDE 7.45 MG/ML
10 INJECTION INTRAVENOUS PRN
Status: DISCONTINUED | OUTPATIENT
Start: 2020-12-08 | End: 2020-12-09 | Stop reason: HOSPADM

## 2020-12-08 RX ORDER — MEGESTROL ACETATE 40 MG/ML
40 SUSPENSION ORAL 2 TIMES DAILY
Status: DISCONTINUED | OUTPATIENT
Start: 2020-12-08 | End: 2020-12-09 | Stop reason: HOSPADM

## 2020-12-08 RX ORDER — SODIUM CHLORIDE 0.9 % (FLUSH) 0.9 %
10 SYRINGE (ML) INJECTION EVERY 12 HOURS SCHEDULED
Status: DISCONTINUED | OUTPATIENT
Start: 2020-12-08 | End: 2020-12-09 | Stop reason: HOSPADM

## 2020-12-08 RX ORDER — PANTOPRAZOLE SODIUM 40 MG/1
40 TABLET, DELAYED RELEASE ORAL
Status: DISCONTINUED | OUTPATIENT
Start: 2020-12-08 | End: 2020-12-09 | Stop reason: HOSPADM

## 2020-12-08 RX ORDER — PROMETHAZINE HYDROCHLORIDE 12.5 MG/1
12.5 TABLET ORAL EVERY 6 HOURS PRN
Status: DISCONTINUED | OUTPATIENT
Start: 2020-12-08 | End: 2020-12-09 | Stop reason: HOSPADM

## 2020-12-08 RX ORDER — IPRATROPIUM BROMIDE AND ALBUTEROL SULFATE 2.5; .5 MG/3ML; MG/3ML
1 SOLUTION RESPIRATORY (INHALATION)
Status: DISCONTINUED | OUTPATIENT
Start: 2020-12-08 | End: 2020-12-09 | Stop reason: HOSPADM

## 2020-12-08 RX ADMIN — ASPIRIN 81 MG: 81 TABLET, COATED ORAL at 09:03

## 2020-12-08 RX ADMIN — DOCUSATE SODIUM 100 MG: 100 CAPSULE, LIQUID FILLED ORAL at 16:53

## 2020-12-08 RX ADMIN — AMIODARONE HYDROCHLORIDE 200 MG: 200 TABLET ORAL at 09:03

## 2020-12-08 RX ADMIN — PREDNISONE 20 MG: 20 TABLET ORAL at 16:53

## 2020-12-08 RX ADMIN — FUROSEMIDE 40 MG: 40 TABLET ORAL at 09:03

## 2020-12-08 RX ADMIN — LATANOPROST 1 DROP: 50 SOLUTION OPHTHALMIC at 21:04

## 2020-12-08 RX ADMIN — APIXABAN 5 MG: 5 TABLET, FILM COATED ORAL at 21:04

## 2020-12-08 RX ADMIN — INSULIN LISPRO 3 UNITS: 100 INJECTION, SOLUTION INTRAVENOUS; SUBCUTANEOUS at 21:04

## 2020-12-08 RX ADMIN — SODIUM CHLORIDE, PRESERVATIVE FREE 10 ML: 5 INJECTION INTRAVENOUS at 06:38

## 2020-12-08 RX ADMIN — POTASSIUM BICARBONATE 40 MEQ: 782 TABLET, EFFERVESCENT ORAL at 16:53

## 2020-12-08 RX ADMIN — INSULIN LISPRO 4 UNITS: 100 INJECTION, SOLUTION INTRAVENOUS; SUBCUTANEOUS at 16:53

## 2020-12-08 RX ADMIN — MEGESTROL ACETATE 40 MG: 40 SUSPENSION ORAL at 21:04

## 2020-12-08 RX ADMIN — IPRATROPIUM BROMIDE AND ALBUTEROL SULFATE 1 AMPULE: .5; 3 SOLUTION RESPIRATORY (INHALATION) at 07:39

## 2020-12-08 RX ADMIN — AMIODARONE HYDROCHLORIDE 200 MG: 200 TABLET ORAL at 21:04

## 2020-12-08 ASSESSMENT — PAIN SCALES - GENERAL: PAINLEVEL_OUTOF10: 0

## 2020-12-08 NOTE — PROGRESS NOTES
Call out to Dr. Brooks Barrientos cardiology. Plans for heart cath today. Would like to review morning meds this a.m. and plans.

## 2020-12-08 NOTE — PLAN OF CARE
Problem: Skin Integrity:  Goal: Will show no infection signs and symptoms  Description: Will show no infection signs and symptoms  12/8/2020 0120 by Emma Dean RN  Outcome: Ongoing     Problem: Skin Integrity:  Goal: Absence of new skin breakdown  Description: Absence of new skin breakdown  Outcome: Ongoing   Pt skin remains intact, mepilex re enforced on buttocks, waffle mattress on bed and pt encouraged to turn q 2 hs. Will continue to monitor. Problem: Infection:  Goal: Will remain free from infection  Description: Will remain free from infection  12/8/2020 0120 by Emma Dean RN  Outcome: Ongoing   Pt VS will remain stable and Labs will return to normal.      Problem: Safety:  Goal: Free from accidental physical injury  Description: Free from accidental physical injury  12/8/2020 0120 by Emma Dean RN  Outcome: Ongoing     Problem: Safety:  Goal: Free from intentional harm  Description: Free from intentional harm  Outcome: Ongoing   Pt remained free of falls, non skid socks are on, bed alarm on, call light within reach, and patient instructed to call for help when needed. Problem: Pain:  Goal: Patient's pain/discomfort is manageable  Description: Patient's pain/discomfort is manageable  Outcome: Ongoing     Problem: Pain:  Goal: Pain level will decrease  Description: Pain level will decrease  Outcome: Ongoing     Problem: Pain:  Goal: Control of acute pain  Description: Control of acute pain  Outcome: Ongoing     Problem: Pain:  Goal: Control of chronic pain  Description: Control of chronic pain  Outcome: Ongoing   Pt assessed for pain, non-pharm interventions used, pain meds given if needed, Pt reassesed for pain.

## 2020-12-08 NOTE — FLOWSHEET NOTE
Pt admitted to room 2037 post heart cath. Oriented to room, call light and bed mechanics. Side rails up x2. Call light within reach. Orders reviewed. Vitals in progress along with post cath assessment.

## 2020-12-08 NOTE — BRIEF OP NOTE
[facility]  Cardiac catheterization note        Date:   12/8/2020  Patient name: Shaggy Romero  Date of admission:  12/8/2020  3:21 AM  MRN:   [id number]  YOB: 1950    Operators:  Leeanne Coppola MD      Pre Procedure Conscious Sedation Data:    ASA Class:    [] I [x] II [] III [] IV    Mallampati Class:  [] I [x] II [] III [] IV      Indication:  Acute CHF  Cardiomyopathy, new onset     Procedure:   1. Left heart catheterization via right radial artery access   2. Selective left and right coronary angiography   3. LVEDP      Access:  [] Femoral  [x] Radial  artery       [x] Right  [] Left    Procedure: After informed consent was obtained with explanation of the risks and benefits, patient was brought to the cath lab. The access area was prepped and draped in sterile fashion. 1% lidocaine was used for local block. The artery was cannulated with 6  Fr sheath with brisk arterial blood return. The side port was frequently flushed and aspirated with normal saline. Findings:  Left main: NL    LAD: calcified, mid 40% stenosis   D1 is large, patent with mild disease    LCX: Large vessel with 10-20% stenosis  OM1 is small, patent, ostial 40% stenosis   OM2 is large, normal      RCA: Large, dominant, 10-20% stenosis   RPDA/RPL are moderate sized vessels and patent with mild disease    The LV gram was not performed due to CKD. LVEDP 2 mm hg       Conclusions:  1. Mild Non-obstructive CAD  2. Normal LVEDP       Recommendation:  1. Medical treatment for cardiomyopathy   2.  Repeat echo in 3 months to reassess LVEF on OMT         Estimated Blood loss: 5 cc     Florin Farrell MD, [address] [facility]

## 2020-12-08 NOTE — PLAN OF CARE
Problem: Skin Integrity:  Goal: Will show no infection signs and symptoms  Description: Will show no infection signs and symptoms  Outcome: Ongoing     Problem: Infection:  Goal: Will remain free from infection  Description: Will remain free from infection  Outcome: Ongoing     Problem: Safety:  Goal: Free from accidental physical injury  Description: Free from accidental physical injury  Outcome: Ongoing     Problem: Daily Care:  Goal: Daily care needs are met  Description: Daily care needs are met  Outcome: Ongoing     Problem: Skin Integrity:  Goal: Skin integrity will stabilize  Description: Skin integrity will stabilize  Outcome: Ongoing

## 2020-12-08 NOTE — PROGRESS NOTES
Dressing change performed to lt buttock at this time; wound was flushed with saline; patted dry with sterile gauze with Optifoam dressing in place. No drainage noted. Pt with smear of stool; pt cleaned and creme applied to coccyx area. Castro care provided; securement device replaced. Pt repositioned; pillow support. Encouraged pt to increase activity with PT/OT. Pt denies needs at this time; will continue to monitor.

## 2020-12-08 NOTE — PROGRESS NOTES
Patient upset some of her belonging's (including cpap) is still at [address] [place] [address], she sent a family member over and they waited outside for over an hour to pick belongings up. Spoke with Matilde [address] from [address] [place] [address] and she states belongings are still in patient's room and if we call back and ask for her, then she will be happy to personally make sure patient get's any of her belongings needed.

## 2020-12-08 NOTE — PROGRESS NOTES
Spoke to Dr. Claudia Saxena and updated on patient. He will get back to me and is hoping for plans of heart cath this afternoon. Hold Eliquis this a.m., but okay to give ASA, amiodarone, and lasix. /70. Patient still a-fib HR 80's. Will monitor.

## 2020-12-08 NOTE — PLAN OF CARE
Problem: Falls - Risk of:  Goal: Will remain free from falls  Description: Will remain free from falls  Outcome: Ongoing   Pt fall risk, fall band present, falling star, safety alarm activated and in use as needed. Hourly rounding performed. Pt encouraged to use call light. See Gerold Angelucci fall risk assessment.

## 2020-12-08 NOTE — PROGRESS NOTES
[facility]   Progress Note                   Date:   12/8/2020  Patient name: Brigid Setting  Date of admission:  12/8/2020  3:21 AM  MRN:   [id number]  YOB: 1950  PCP: Gilma Nash MD    Reason for Admission:  CHF     Subjective:       Feeling better, dyspnea improved, -ve 750 cc, remains in atrial fib, rate better controlled  Cr 0.87 today     Medications:   Scheduled Meds:   ipratropium-albuterol  1 ampule Inhalation Q4H WA    carvedilol  25 mg Oral BID     sodium chloride flush  10 mL Intravenous 2 times per day    amiodarone  200 mg Oral BID    apixaban  5 mg Oral BID    aspirin  81 mg Oral Daily    docusate sodium  100 mg Oral Daily    furosemide  40 mg Oral Daily    insulin lispro  0-12 Units Subcutaneous TID WC    insulin lispro  0-6 Units Subcutaneous Nightly    latanoprost  1 drop Both Eyes Nightly    megestrol  40 mg Oral BID    pantoprazole  40 mg Oral QAM AC    predniSONE  20 mg Oral Daily    Followed by   Gilma Nash ON 12/10/2020] predniSONE  15 mg Oral Daily    Followed by   Gilma Nash ON 12/13/2020] predniSONE  10 mg Oral Daily    Followed by   Gilma Nash ON 12/16/2020] predniSONE  5 mg Oral Daily       Continuous Infusions:   dextrose         CBC:   Recent Labs     12/06/20  0728 12/07/20  0623   WBC 7.8 7.1   HGB 12.4 12.4    133*     BMP:    Recent Labs     12/06/20  0728 12/07/20  0623 12/08/20  1257    138 141   K 3.8 3.9 3.4*   CL 88* 91* 91*   CO2 41* 42* 44*   BUN 41* 43* 37*   CREATININE 1.12* 1.12* 0.87   GLUCOSE 258* 224* 139*     Hepatic: No results for input(s): AST, ALT, ALB, BILITOT, ALKPHOS in the last 72 hours. Troponin: No results for input(s): TROPONINI in the last 72 hours. BNP: No results for input(s): BNP in the last 72 hours. Lipids: No results for input(s): CHOL, HDL in the last 72 hours. Invalid input(s): LDLCALCU  INR: No results for input(s): INR in the last 72 hours.     Objective:   Vitals: /69 Pulse 93   Temp 98.2 °F (36.8 °C) (Oral)   Resp 16   Ht 5' 7.52\" (1.715 m)   Wt 266 lb 8 oz (120.9 kg)   SpO2 91%   BMI 41.10 kg/m²     General appearance: obese,  awake, alert, in no apparent respiratory distress on NC oxygen supp  HEENT: Head: Normocephalic, no lesions, without obvious abnormality  Neck: no JVD  Lungs: reduced air entry at bases, no basilar rales, no wheezing   Heart: regular rate and rhythm, S1, S2 normal, no murmur, click, rub or gallop  Abdomen: soft, non-tender; bowel sounds normal  Extremities: 2+LE edema  Neurologic: Mental status: Alert, oriented. Motor and sensory not done. EKG 12/4/2020:   Atrial fib, incomplete RBBB     TTE 12/3/2020  Left ventricle is dilated. Global left ventricular systolic function is  moderate to severely reduced Estimated ejection fraction is 30-35 %. Mild left ventricular hypertrophy. Leftward compression of inter-ventricular septum (\"D-sign\") indicating RV  pressure overload. False tendon seen the apex of the heart. Left atrium is severely dilated. Inter-atrial septum appears so be intact. Right atrium is severely dilated . Severely dilated right ventricular cavity. Reduced right ventricular function. Aortic leaflet calcification with mild stenosis. Moderate aortic insufficiency. Aortic valve is trileaflet. Normal mitral valve structure and function. Mitral annular calcification is seen. Mild-moderate mitral regurgitation. Assessment:   1. Acute hypoxic resp failure  2. Acute systolic heart failure with LVEF 35% (new decline as compared to echo in may 2020)   3. Persistent atrial fibrillation vs atypical atrial flutter   4. Hx of PAF   5. Moderate MR  6. COPD  7. Smoker  8.  Hx of metastatic breast CA on palliative chemo     Patient Active Problem List:     Hypoxia     Chronic obstructive pulmonary disease with acute exacerbation (HCC)     Hypertension     Malignant neoplasm of bone (HCC)     Neuropathy     Paroxysmal atrial fibrillation (HCC)     Atypical atrial flutter (HCC)     Sleep apnea     CKD (chronic kidney disease)     Type 2 diabetes mellitus, without long-term current use of insulin (HCC)     Acute on chronic combined systolic and diastolic congestive heart failure (HCC)     Pneumonia due to infectious organism     Acute on chronic respiratory failure (HCC)     Pneumonia due to organism     Congestive heart failure of unknown etiology (HCC)     Hyperlipidemia     COPD (chronic obstructive pulmonary disease) (HCC)     Acute on chronic diastolic congestive heart failure (HCC)     Type 2 diabetes mellitus (HCC)     Vertigo     Morbid obesity (HCC)     Type 2 MI (myocardial infarction) ([address]        Treatment Plan:   1. Proceed with coronary angiography to r/o ischemic etiology of acute CHF/new decline in LVEF. Risks, benefits and alternatives of procedure discussed with patient in detail, she verbalizes understanding and willing to proceed. 2. Cont medical therapy with po lasix, bb, amiodarone  3. Resume therapeutic AV with eliquis post cath   4.  Will need f/u with EP at [facility] for possible AVN ablation and CRT-D         Diego Hutchins MD, [facility]

## 2020-12-08 NOTE — H&P
[facility]  Office: [address], DO, Niibaudilio Lim, DO, Gabe Blanco, DO, Chhaya Galvan Blood, DO, Kevin Andre MD, Rigo Cruz MD, Barry Aguayo MD, Berenice Alejandro MD, Suzan Walters MD, Bushra Villa MD, Shruthi Dean MD, Stefanie Hendrix MD, Ernestina Gill MD, Roverto Fraser DO, Gary Parr MD, Nyoka Hodgkins, MD, Margareth Guajardo DO, Jadene Boast, MD,  Anibal Ruelas DO, Castillo Cheek MD, Carolina Poe MD, Rut Kim CNP, [facility], CNP, Betina Kc, CNP, Kit Elizabeth, CNS, Val Sorensen, CNP, Nila Garza, CNP, Bill Foot, CNP, Manjula Gamma, CNP, Jenelle Metcalf, CNP, Eli Fajardo PA-C, Mimi Moyer DNP, Antelmo Rea, CNP, Rachel Waddell, CNP, Lashonda De La Torre, CNP, Weston Bettencourt, CNP, Sherrill Youngblood, CNP         [facility]    HISTORY AND PHYSICAL EXAMINATION            Date:   12/8/2020  Patient name:  Sreedhar Walsh  Date of admission:  12/8/2020  3:21 AM  MRN:   [id number]  Account:  [de-identified]  YOB: 1950  PCP:    Isaac Pratt MD  Room:   8968/1454-12  Code Status:    Full Code    Chief Complaint:     Shortness of breath  History Obtained From:     patient    History of Present Illness:     Sreedhar Walsh is a 78 y.o. Non-/non  female who was transferred from [facility] where she was admitted to the hospital for the management of Acute on chronic diastolic congestive heart failure ([facility] Patient initially presented to [facility] ER due to hypoxia with oxygen levels in the 70s per EMS while at the nursing home where she was for acute inpatient rehab. She was found to be in A. fib with RVR as well as an acute CHF exacerbation.   She was placed on high flow oxygen, started on IV Cardizem which was later switched to oral Coreg and started on IV Rocephin and Zithromax which has since been discontinued as her symptoms were more consistent with CHF, pneumonia unlikely. IV Lasix was also initiated. VQ scan consistent with PE, and she was initially started on heparin drip which has since been switched to Eliquis per cardiology. Per cardiology notes there has been a new drop in patient's EF from 50 to 55% in May 2020 to 30 to 35% during this admission, she was transferred to [facility] for cardiac cath and further management. She is currently on 2 L via nasal cannula  Past Medical History:     Past Medical History:   Diagnosis Date    CHF (congestive heart failure) (HCC)     CKD (chronic kidney disease)     COPD (chronic obstructive pulmonary disease) ([facility] [facility]     Diabetes mellitus ([facility]     Hyperlipidemia     Hypertension     Vertigo         Past Surgical History:     No past surgical history on file. Medications Prior to Admission:     Prior to Admission medications    Medication Sig Start Date End Date Taking?  Authorizing Provider   apixaban (ELIQUIS) 5 MG TABS tablet Take 5 mg by mouth 2 times daily    Historical Provider, MD   aspirin 81 MG EC tablet Take 81 mg by mouth daily    Historical Provider, MD   Biotin 1 MG CAPS Take 1,000 mcg by mouth    Historical Provider, MD   denosumab (XGEVA) 120 MG/1.7ML SOLN SC injection Inject 120 mg into the skin every 30 days Given on the 16th of the month subq    Historical Provider, MD   dilTIAZem (DILACOR XR) 240 MG extended release capsule Take 360 mg by mouth daily    Historical Provider, MD   FULVESTRANT IM Inject 500 mg into the muscle every 30 days On the 16th of the month    Historical Provider, MD   latanoprost (XALATAN) 0.005 % ophthalmic solution Place 1 drop into both eyes nightly    Historical Provider, MD   megestrol (MEGACE) 40 MG tablet Take 40 mg by mouth 2 times daily    Historical Provider, MD   metFORMIN (GLUCOPHAGE) 500 MG tablet Take 500 mg by mouth 2 times daily (with meals)    Historical Provider, MD   omeprazole (PRILOSEC) 20 MG delayed release capsule Take 20 mg by mouth daily Historical Provider, MD   oxyCODONE-acetaminophen (PERCOCET) 5-325 MG per tablet Take 1 tablet by mouth every 6 hours as needed for Pain. Historical Provider, MD   triamcinolone (KENALOG) 0.025 % cream Apply topically 2 times daily as needed Apply Topically    Historical Provider, MD        Allergies:     Ciprofloxacin    Social History:     Tobacco:    reports that she has never smoked. She has never used smokeless tobacco.  Alcohol:      reports previous alcohol use. Drug Use:  reports no history of drug use. Family History:     Family History   Problem Relation Age of Onset    Hypertension Mother     Cancer Father        Review of Systems:     Positive and Negative as described in HPI.     CONSTITUTIONAL:  negative for fevers, chills, sweats, fatigue, weight loss  HEENT:  negative for vision, hearing changes, runny nose, throat pain  RESPIRATORY:  negative for shortness of breath, cough, congestion, wheezing  CARDIOVASCULAR:  negative for chest pain, palpitations  GASTROINTESTINAL:  negative for nausea, vomiting, diarrhea, constipation, change in bowel habits, abdominal pain   GENITOURINARY:  negative for difficulty of urination, burning with urination, frequency   INTEGUMENT:  negative for rash, skin lesions, easy bruising   HEMATOLOGIC/LYMPHATIC:  negative for swelling/edema   ALLERGIC/IMMUNOLOGIC:  negative for urticaria , itching  ENDOCRINE:  negative increase in drinking, increase in urination, hot or cold intolerance  MUSCULOSKELETAL:  negative joint pains, muscle aches, swelling of joints  NEUROLOGICAL:  negative for headaches, dizziness, lightheadedness, numbness, pain, tingling extremities  BEHAVIOR/PSYCH:  negative for depression, anxiety    Physical Exam:   /70   Pulse 95   Temp 98.1 °F (36.7 °C) (Oral)   Resp 16   Ht 5' 7.52\" (1.715 m)   Wt 266 lb 8 oz (120.9 kg)   SpO2 91%   BMI 41.10 kg/m²   Temp (24hrs), Av °F (36.7 °C), Min:97.5 °F (36.4 °C), Max:98.5 °F (36.9 °C)    Recent Labs     12/07/20  1200 12/07/20  1530 12/07/20  2032 12/08/20  0856   POCGLU 202* 294* 277* 120*       Intake/Output Summary (Last 24 hours) at 12/8/2020 1043  Last data filed at 12/8/2020 0710  Gross per 24 hour   Intake --   Output 750 ml   Net -750 ml       General Appearance:  alert, chronically ill appearing, and in no acute distress  Mental status: oriented to person, place, and time  Head:  normocephalic, atraumatic  Eye: no icterus, redness, pupils equal and reactive, extraocular eye movements intact, conjunctiva clear  Ear: normal external ear, no discharge, hearing intact  Nose:  no drainage noted  Mouth: mucous membranes moist  Neck: supple, no carotid bruits, thyroid not palpable  Lungs: Bilateral equal air entry, clear to auscultation, no wheezing, rales or rhonchi, normal effort  Cardiovascular: normal rate, irregular rhythm, no murmur, gallop, rub. Abdomen: Soft, nontender, nondistended, normal bowel sounds, no hepatomegaly or splenomegaly  Neurologic: There are no new focal motor or sensory deficits, normal muscle tone and bulk, no abnormal sensation, normal speech, cranial nerves II through XII grossly intact  Skin: Abrasion to chin with dressing clean dry and intact.   Otherwise no gross lesions, rashes, bruising or bleeding on exposed skin area  Extremities:  peripheral pulses palpable, no pedal edema or calf pain with palpation, generalized weakness  Psych: normal affect     Investigations:      Laboratory Testing:  Recent Results (from the past 24 hour(s))   POC Glucose Fingerstick    Collection Time: 12/07/20 12:00 PM   Result Value Ref Range    POC Glucose 202 (H) 65 - 105 mg/dL   POC Glucose Fingerstick    Collection Time: 12/07/20  3:30 PM   Result Value Ref Range    POC Glucose 294 (H) 65 - 105 mg/dL   POC Glucose Fingerstick    Collection Time: 12/07/20  8:32 PM   Result Value Ref Range    POC Glucose 277 (H) 65 - 105 mg/dL   POC Glucose Fingerstick    Collection Time: 12/08/20  8:56 AM   Result Value Ref Range    POC Glucose 120 (H) 65 - 105 mg/dL       Imaging/Diagnostics:  Nm Lung Scan Perfusion Only    Result Date: 12/4/2020  Indeterminate for pulmonary embolism given presence of effusion and 2 moderate size perfusion defects. This does not meet criteria for high probability for pulmonary embolism. Xr Chest Portable    Result Date: 12/6/2020  No significant change in chest findings. Xr Chest Portable    Result Date: 12/5/2020  No significant change in chest findings compared to the prior study. CHF findings persist.     Xr Chest Portable    Result Date: 12/3/2020  Cardiomegaly with perihilar congestion. Bilateral effusions and airspace opacities favor atelectasis. Us Dup Lower Extremity Left Seferino    Result Date: 12/7/2020  Noncompressibility of the distal femoral vein and partial compressibility of the popliteal vein suggesting underlying thrombosis. The findings were sent to the Radiology Results [address] at 2:33 pm on 12/7/2020to be communicated to a licensed caregiver. Us Dup Lower Extremity Right Seferino    Result Date: 12/7/2020  No evidence of DVT in the right lower extremity.        Assessment :      Hospital Problems           Last Modified POA    * (Principal) Acute on chronic diastolic congestive heart failure ([address] 12/8/2020 Yes    Hypertension 12/8/2020 Yes    Paroxysmal atrial fibrillation ([address] 12/8/2020 Yes    Atypical atrial flutter ([address] 12/8/2020 Yes    Overview Signed 12/4/2020  7:51 AM by EMY Guerrero CNP     Added automatically from request for surgery [id number]         Sleep apnea 12/8/2020 Yes    CKD (chronic kidney disease) 12/8/2020 Yes    Type 2 diabetes mellitus, without long-term current use of insulin ([address] 12/8/2020 Yes    Acute on chronic respiratory failure ([address] 12/8/2020 Yes    Overview Signed 12/4/2020  4:51 PM by EMY Guerrero CNP     2 l nc through bipap at night         Pneumonia due to organism 12/8/2020 Yes    Hyperlipidemia 12/8/2020 Yes    COPD (chronic obstructive pulmonary disease) ([facility] 12/8/2020 Yes    Vertigo 12/8/2020 Yes    Morbid obesity ([facility] 12/8/2020 Yes    Type 2 MI (myocardial infarction) ([facility] 12/8/2020 Yes          Plan:     Patient status inpatient in the Progressive Unit/Step down    1. Keep n.p.o. for cardiac cath this afternoon, hold Eliquis today per cardiology, await results  2. Monitor and control blood pressure, continue current cardiac meds, managed per cardiology  3. Acute on chronic CHF, appears to be well compensated, IV Lasix has been transitioned to oral per cardiology. 4. Monitor and control blood sugars, continue insulin sliding scale  5. Acute on chronic respiratory failure improving, continue supplemental oxygen, wean as tolerated. Continue steroids and aerosols as ordered, initiate CPAP while asleep, history of obstructive sleep apnea  6. Monitor labs, replace electrolytes as needed  7. PT/OT evaluation when appropriate following cath recovery  8. Plan discussed with patient and staff    Consultations:   IP CONSULT TO HEART FAILURE NURSE/COORDINATOR  IP CONSULT TO CARDIOLOGY     Patient is admitted as inpatient status because of co-morbidities listed above, severity of signs and symptoms as outlined, requirement for current medical therapies and most importantly because of direct risk to patient if care not provided in a hospital setting. Expected length of stay > 48 hours.     EMY LEVY NP  12/8/2020  10:43 AM    Copy sent to Dr. Charles Miranda MD

## 2020-12-08 NOTE — CARE COORDINATION
Discharge planning    Patient transferred from [facility] and chart reviewed. Patient transferred as she needs to have cardiac cath today. Patient is from [facility] but unclear in what capacity she is there for ( skilled vs AL vs long term). Message left with SS to see if they can discuss with [facility]. Patient currently on 2 liters NC> PT & OT are using max assist and nikhil steady. ANDREW in epic and will follow cardiac work up and SS input.

## 2020-12-09 ENCOUNTER — APPOINTMENT (OUTPATIENT)
Dept: GENERAL RADIOLOGY | Age: 70
DRG: 280 | End: 2020-12-09
Attending: INTERNAL MEDICINE
Payer: MEDICARE

## 2020-12-09 VITALS
RESPIRATION RATE: 17 BRPM | WEIGHT: 266.5 LBS | HEART RATE: 99 BPM | OXYGEN SATURATION: 90 % | TEMPERATURE: 97.9 F | SYSTOLIC BLOOD PRESSURE: 94 MMHG | DIASTOLIC BLOOD PRESSURE: 50 MMHG | BODY MASS INDEX: 40.39 KG/M2 | HEIGHT: 68 IN

## 2020-12-09 PROBLEM — I26.99 PULMONARY EMBOLI (HCC): Status: ACTIVE | Noted: 2020-12-05

## 2020-12-09 LAB
ANION GAP SERPL CALCULATED.3IONS-SCNC: 4 MMOL/L (ref 9–17)
BNP INTERPRETATION: ABNORMAL
BUN BLDV-MCNC: 37 MG/DL (ref 8–23)
BUN/CREAT BLD: 43 (ref 9–20)
CALCIUM SERPL-MCNC: 9.1 MG/DL (ref 8.6–10.4)
CHLORIDE BLD-SCNC: 94 MMOL/L (ref 98–107)
CO2: 45 MMOL/L (ref 20–31)
CREAT SERPL-MCNC: 0.86 MG/DL (ref 0.5–0.9)
GFR AFRICAN AMERICAN: >60 ML/MIN
GFR NON-AFRICAN AMERICAN: >60 ML/MIN
GFR SERPL CREATININE-BSD FRML MDRD: ABNORMAL ML/MIN/{1.73_M2}
GFR SERPL CREATININE-BSD FRML MDRD: ABNORMAL ML/MIN/{1.73_M2}
GLUCOSE BLD-MCNC: 112 MG/DL (ref 65–105)
GLUCOSE BLD-MCNC: 130 MG/DL (ref 65–105)
GLUCOSE BLD-MCNC: 141 MG/DL (ref 70–99)
GLUCOSE BLD-MCNC: 339 MG/DL (ref 65–105)
HCT VFR BLD CALC: 42.4 % (ref 36.3–47.1)
HEMOGLOBIN: 13.1 G/DL (ref 11.9–15.1)
MCH RBC QN AUTO: 30.1 PG (ref 25.2–33.5)
MCHC RBC AUTO-ENTMCNC: 30.9 G/DL (ref 28.4–34.8)
MCV RBC AUTO: 97.5 FL (ref 82.6–102.9)
NRBC AUTOMATED: 0 PER 100 WBC
PDW BLD-RTO: 14.6 % (ref 11.8–14.4)
PLATELET # BLD: 122 K/UL (ref 138–453)
PMV BLD AUTO: 11.5 FL (ref 8.1–13.5)
POTASSIUM SERPL-SCNC: 3.8 MMOL/L (ref 3.7–5.3)
PRO-BNP: 5086 PG/ML
RBC # BLD: 4.35 M/UL (ref 3.95–5.11)
SODIUM BLD-SCNC: 143 MMOL/L (ref 135–144)
WBC # BLD: 7.9 K/UL (ref 3.5–11.3)

## 2020-12-09 PROCEDURE — 6370000000 HC RX 637 (ALT 250 FOR IP): Performed by: NURSE PRACTITIONER

## 2020-12-09 PROCEDURE — 73502 X-RAY EXAM HIP UNI 2-3 VIEWS: CPT

## 2020-12-09 PROCEDURE — 83880 ASSAY OF NATRIURETIC PEPTIDE: CPT

## 2020-12-09 PROCEDURE — 99239 HOSP IP/OBS DSCHRG MGMT >30: CPT | Performed by: INTERNAL MEDICINE

## 2020-12-09 PROCEDURE — 2700000000 HC OXYGEN THERAPY PER DAY

## 2020-12-09 PROCEDURE — 2580000003 HC RX 258: Performed by: INTERNAL MEDICINE

## 2020-12-09 PROCEDURE — 97166 OT EVAL MOD COMPLEX 45 MIN: CPT

## 2020-12-09 PROCEDURE — 97530 THERAPEUTIC ACTIVITIES: CPT

## 2020-12-09 PROCEDURE — 36415 COLL VENOUS BLD VENIPUNCTURE: CPT

## 2020-12-09 PROCEDURE — 97162 PT EVAL MOD COMPLEX 30 MIN: CPT

## 2020-12-09 PROCEDURE — 97112 NEUROMUSCULAR REEDUCATION: CPT

## 2020-12-09 PROCEDURE — 82947 ASSAY GLUCOSE BLOOD QUANT: CPT

## 2020-12-09 PROCEDURE — 80048 BASIC METABOLIC PNL TOTAL CA: CPT

## 2020-12-09 PROCEDURE — 85027 COMPLETE CBC AUTOMATED: CPT

## 2020-12-09 PROCEDURE — 97535 SELF CARE MNGMENT TRAINING: CPT

## 2020-12-09 PROCEDURE — 94761 N-INVAS EAR/PLS OXIMETRY MLT: CPT

## 2020-12-09 RX ORDER — CARVEDILOL 6.25 MG/1
6.25 TABLET ORAL 2 TIMES DAILY WITH MEALS
Status: DISCONTINUED | OUTPATIENT
Start: 2020-12-09 | End: 2020-12-09 | Stop reason: HOSPADM

## 2020-12-09 RX ORDER — PREDNISONE 1 MG/1
5 TABLET ORAL DAILY
Qty: 3 TABLET | Refills: 0 | DISCHARGE
Start: 2020-12-16 | End: 2020-12-19

## 2020-12-09 RX ORDER — PSEUDOEPHEDRINE HCL 30 MG
100 TABLET ORAL DAILY
DISCHARGE
Start: 2020-12-10

## 2020-12-09 RX ORDER — PREDNISONE 1 MG/1
15 TABLET ORAL DAILY
Qty: 30 TABLET | Refills: 0 | DISCHARGE
Start: 2020-12-10 | End: 2020-12-13

## 2020-12-09 RX ORDER — AMIODARONE HYDROCHLORIDE 200 MG/1
200 TABLET ORAL 2 TIMES DAILY
DISCHARGE
Start: 2020-12-09

## 2020-12-09 RX ORDER — OXYCODONE HYDROCHLORIDE AND ACETAMINOPHEN 5; 325 MG/1; MG/1
1 TABLET ORAL EVERY 6 HOURS PRN
Qty: 12 TABLET | Refills: 0 | Status: SHIPPED | OUTPATIENT
Start: 2020-12-09 | End: 2020-12-12

## 2020-12-09 RX ORDER — CARVEDILOL 6.25 MG/1
6.25 TABLET ORAL 2 TIMES DAILY WITH MEALS
Qty: 60 TABLET | Refills: 3 | Status: CANCELLED | DISCHARGE
Start: 2020-12-09

## 2020-12-09 RX ORDER — IPRATROPIUM BROMIDE AND ALBUTEROL SULFATE 2.5; .5 MG/3ML; MG/3ML
3 SOLUTION RESPIRATORY (INHALATION)
Qty: 360 ML | DISCHARGE
Start: 2020-12-09

## 2020-12-09 RX ORDER — PREDNISONE 10 MG/1
10 TABLET ORAL DAILY
Qty: 3 TABLET | Refills: 0 | DISCHARGE
Start: 2020-12-13 | End: 2020-12-16

## 2020-12-09 RX ORDER — FUROSEMIDE 40 MG/1
40 TABLET ORAL DAILY
Qty: 60 TABLET | Refills: 3 | DISCHARGE
Start: 2020-12-10

## 2020-12-09 RX ORDER — ALBUTEROL SULFATE 2.5 MG/3ML
2.5 SOLUTION RESPIRATORY (INHALATION)
Qty: 120 EACH | Refills: 3 | DISCHARGE
Start: 2020-12-09

## 2020-12-09 RX ADMIN — INSULIN LISPRO 8 UNITS: 100 INJECTION, SOLUTION INTRAVENOUS; SUBCUTANEOUS at 13:39

## 2020-12-09 RX ADMIN — APIXABAN 5 MG: 5 TABLET, FILM COATED ORAL at 09:03

## 2020-12-09 RX ADMIN — PREDNISONE 20 MG: 20 TABLET ORAL at 09:03

## 2020-12-09 RX ADMIN — PANTOPRAZOLE SODIUM 40 MG: 40 TABLET, DELAYED RELEASE ORAL at 06:13

## 2020-12-09 RX ADMIN — FUROSEMIDE 40 MG: 40 TABLET ORAL at 09:03

## 2020-12-09 RX ADMIN — AMIODARONE HYDROCHLORIDE 200 MG: 200 TABLET ORAL at 09:03

## 2020-12-09 RX ADMIN — Medication 10 ML: at 09:04

## 2020-12-09 RX ADMIN — MEGESTROL ACETATE 40 MG: 40 SUSPENSION ORAL at 09:04

## 2020-12-09 RX ADMIN — DOCUSATE SODIUM 100 MG: 100 CAPSULE, LIQUID FILLED ORAL at 09:03

## 2020-12-09 RX ADMIN — ASPIRIN 81 MG: 81 TABLET, COATED ORAL at 09:03

## 2020-12-09 ASSESSMENT — PAIN DESCRIPTION - PAIN TYPE: TYPE: ACUTE PAIN

## 2020-12-09 ASSESSMENT — PAIN DESCRIPTION - LOCATION: LOCATION: KNEE;LEG

## 2020-12-09 ASSESSMENT — PAIN DESCRIPTION - ORIENTATION: ORIENTATION: LEFT

## 2020-12-09 NOTE — PLAN OF CARE
Problem: Falls - Risk of:  Goal: Will remain free from falls  Description: Will remain free from falls  12/9/2020 0920 by Tete Evans RN  Outcome: Ongoing  Pt fall risk, fall band present, falling star, safety alarm activated and in use as needed. Hourly rounding performed. Pt encouraged to use call light. See Kim Trujillo fall risk assessment.

## 2020-12-09 NOTE — PROGRESS NOTES
complications  REQUIRES PT FOLLOW UP: Yes  Activity Tolerance  Activity Tolerance: Patient limited by endurance; Patient limited by pain; Patient limited by fatigue       Patient Diagnosis(es): There were no encounter diagnoses. has a past medical history of CHF (congestive heart failure) ([facility] [address] CKD (chronic kidney disease), COPD (chronic obstructive pulmonary disease) ([facility] [address] Diabetes mellitus ([facility] Hyperlipidemia, Hypertension, and Vertigo. has no past surgical history on file.     Restrictions  Restrictions/Precautions  Restrictions/Precautions: General Precautions, Fall Risk  Required Braces or Orthoses?: No  Position Activity Restriction  Other position/activity restrictions: up with assist, telemetry, heels off bed at all times, LUE IV, gomez catheter  Vision/Hearing  Vision: Impaired  Vision Exceptions: Wears glasses at all times  Hearing: Within functional limits     Subjective  General  Chart Reviewed: Yes  Patient assessed for rehabilitation services?: Yes  Additional Pertinent Hx: CHF, CKD, COPD, vertigo, DM  Response To Previous Treatment: Not applicable  General Comment  Comments: RN okays PT  Subjective  Subjective: Pt agreeable to PT  Pain Screening  Patient Currently in Pain: Yes  Pain Assessment  Pain Assessment: 0-10  Pain Type: Acute pain  Pain Location: Knee;Leg  Pain Orientation: Left  Vital Signs  Patient Currently in Pain: Yes       Orientation  Orientation  Overall Orientation Status: Within Functional Limits  Social/Functional History  Social/Functional History  Lives With: Alone  Type of Home: House  Home Layout: Two level, Performs ADL's on one level, Able to Live on Main level with bedroom/bathroom(pt reports doesn't access second floor)  Home Access: Ramped entrance  Bathroom Shower/Tub: Walk-in shower  Bathroom Toilet: Handicap height(used vanity for support)  Bathroom Equipment: Shower chair, Grab bars in shower  Home Equipment: Rolling walker  Receives Help From: Home health, Other Safety Education & Training, Positioning, ADL/Self-care Training  Safety Devices  Type of devices: Call light within reach, Gait belt, Left in chair, Nurse notified, Patient at risk for falls, Bed alarm in place    G-Code       OutComes Score                                                  AM-PAC Score  AM-PAC Inpatient Mobility Raw Score : 9 (12/09/20 0947)  AM-PAC Inpatient T-Scale Score : 30.55 (12/09/20 0947)  Mobility Inpatient CMS 0-100% Score: 81.38 (12/09/20 0947)  Mobility Inpatient CMS G-Code Modifier : CM (12/09/20 0994)          Goals  Short term goals  Time Frame for Short term goals: 12 visits  Short term goal 1: Inc bed mobility to indep; Short term goal 2: Pt able to transfer bed to chair with safest device & Alejandra  Short term goal 3: Pt to tolerate sitting EOB up to 30 minutes with least UB support to improve core stability & repositioning for pressure relief, & to prepare for standing/gait  Short term goal 4: Pt able to tolerate 30-40 min of activity to include 15-20 reps of ex & functional mobility  to facilitate activity tolerance to [facility]; Short term goal 5: Ed pt on home ex's, safety & energy principles & issue written home program;       Therapy Time   Individual Concurrent Group Co-treatment   Time In [phone]         Time Out 0947         Minutes 50+10=60              Additional 10 minutes for chart review      Co-treatment with OT warranted secondary to decreased safety and independence requiring 2 skilled therapy professionals to address individual discipline's goals. PT addressing pre gait trunk strengthening, weight shifting prior to transfers, transfer training and postural control in sitting.       KLEVER MARTINEZ, PT       .

## 2020-12-09 NOTE — PROGRESS NOTES
[facility]  Office: [address], DO, Loren Grewal, DO, Roberto Evette, DO, Bessy Otoole Jenni, DO, Mani Mack MD, Lane Vela MD, Tran San MD, Angelo Rivera MD, Jose Sanchez MD, Beryle Marseille, MD, Ciro Pallas, MD, Curly Ahumada MD, Ernestina Almaguer MD, Ang Wong, DO, Vee Leal MD, Jayashree Odom MD, Lindsay Stevenson, DO, Renan Jordan MD,  Mikayla Mcneal, DO, Harry Romero MD, Whitney Yap MD, Anu Tomlinson, [facility], [facility], [facility], Gomez Tamayo CNP, Surinder Martinez, CNS, Tom Andre CNP, Candelaria Nolasco, CNP, Emmanuelle Callahan, CNP, Cydney Dunn CNP, Kam Ragsdale, CNP, Clarissa Durand PA-C, Mortimer Guardian, [facility], Eber Capellan, CNP, Lavonne Frankel, CNP, Michelle Flynn, CNP, Mary Rueda, CNP, [facility]    Progress Note    12/9/2020    12:25 PM    Name:   Emerson Webster  MRN:     [id number]     Acct:      [de-identified]   Room:   [address] [place] [address]   Day:  1  Admit Date:  12/8/2020  3:21 AM    PCP:   Ann Marie Martinez MD  Code Status:  Full Code    Subjective:     C/C:shortness of breath   Interval History Status: improved. staff reports patient refusing Bipap and breathing treatments. VSS, no acute events overnight     Brief History:   Emerson Webster is a 78 y.o. Non-/non  female who was transferred from [facility] where she was admitted to the hospital for the management of Acute on chronic diastolic congestive heart failure ([facility] [address] Patient initially presented to [facility] ER due to hypoxia with oxygen levels in the 70s per EMS while at the nursing home where she was for acute inpatient rehab. She was found to be in A. fib with RVR as well as an acute CHF exacerbation.   She was placed on high flow oxygen, started on IV Cardizem which was later switched to oral Coreg and started on IV Rocephin and Zithromax which has since been discontinued as her symptoms were more consistent with CHF, pneumonia unlikely. IV Lasix was also initiated. VQ scan consistent with PE, and she was initially started on heparin drip which has since been switched to Eliquis per cardiology.       Per cardiology notes there has been a new drop in patient's EF from 50 to 55% in May 2020 to 30 to 35% during this admission, she was [facility] cardiac cath and further management.      She is currently on 2 L via nasal cannula    12/8/20:cardiac cath revealed mild non-obstructive CAD, normal LVEDP. Treat medically   Review of Systems:     Constitutional:  negative for chills, fevers, sweats  Respiratory:  negative for cough, dyspnea on exertion, shortness of breath, wheezing  Cardiovascular:  negative for chest pain, chest pressure/discomfort, lower extremity edema, palpitations  Gastrointestinal:  negative for abdominal pain, constipation, diarrhea, nausea, vomiting  Neurological:  negative for dizziness, headache  + for generalized weakness  Medications: Allergies:     Allergies   Allergen Reactions    Ciprofloxacin        Current Meds:   Scheduled Meds:    carvedilol  6.25 mg Oral BID     ipratropium-albuterol  1 ampule Inhalation Q4H WA    amiodarone  200 mg Oral BID    apixaban  5 mg Oral BID    aspirin  81 mg Oral Daily    docusate sodium  100 mg Oral Daily    furosemide  40 mg Oral Daily    insulin lispro  0-12 Units Subcutaneous TID WC    insulin lispro  0-6 Units Subcutaneous Nightly    latanoprost  1 drop Both Eyes Nightly    megestrol  40 mg Oral BID    pantoprazole  40 mg Oral QAM AC    [START ON 12/10/2020] predniSONE  15 mg Oral Daily    Followed by   Leonarda José ON 12/13/2020] predniSONE  10 mg Oral Daily    Followed by   Leonarda José ON 12/16/2020] predniSONE  5 mg Oral Daily    sodium chloride flush  10 mL Intravenous 2 times per day     Continuous Infusions:    dextrose       PRN Meds: albuterol, acetaminophen **OR** acetaminophen, polyethylene glycol, promethazine **OR** ondansetron, dextrose, dextrose, glucagon (rDNA), glucose, magnesium sulfate, oxyCODONE-acetaminophen, potassium chloride **OR** potassium alternative oral replacement **OR** potassium chloride, sodium chloride flush    Data:     Past Medical History:   has a past medical history of CHF (congestive heart failure) ([facility] [address] CKD (chronic kidney disease), COPD (chronic obstructive pulmonary disease) ([facility] Diabetes mellitus ([facility] Hyperlipidemia, Hypertension, and Vertigo. Social History:   reports that she has never smoked. She has never used smokeless tobacco. She reports previous alcohol use. She reports that she does not use drugs. Family History:   Family History   Problem Relation Age of Onset    Hypertension Mother     Cancer Father        Vitals:  BP (!) 96/59   Pulse 83   Temp 98.6 °F (37 °C) (Oral)   Resp 18   Ht 5' 7.52\" (1.715 m)   Wt 266 lb 8 oz (120.9 kg)   SpO2 93%   BMI 41.10 kg/m²   Temp (24hrs), Av.2 °F (36.8 °C), Min:97.2 °F (36.2 °C), Max:98.9 °F (37.2 °C)    Recent Labs     20  0607 20  0800 20  1210   POCGLU 280* 130* 112* 339*       I/O (24Hr):     Intake/Output Summary (Last 24 hours) at 2020 1225  Last data filed at 2020 1035  Gross per 24 hour   Intake 1150 ml   Output 2350 ml   Net -1200 ml       Labs:  Hematology:  Recent Labs     20  0623 20  0609   WBC 7.1 7.9   RBC 3.99* 4.35   HGB 12.4 13.1   HCT 38.6 42.4   MCV 96.6 97.5   MCH 31.0 30.1   MCHC 32.1 30.9   RDW 16.2* 14.6*   * 122*   MPV 9.6 11.5     Chemistry:  Recent Labs     20  0623 20  1257 20  0609    141 143   K 3.9 3.4* 3.8   CL 91* 91* 94*   CO2 42* 44* 45*   GLUCOSE 224* 139* 141*   BUN 43* 37* 37*   CREATININE 1.12* 0.87 0.86   ANIONGAP 5* 6* 4*   LABGLOM 48* >60 >60   GFRAA 59* >60 >60   CALCIUM 9.7 9.3 9.1   PROBNP 6,225*  --  5,086*     Recent Labs     20  1225 20  1648 20 12/09/20  0607 12/09/20  0800 12/09/20  1210   POCGLU 157* 210* 280* 130* 112* 339*     ABG:  Lab Results   Component Value Date    POCPH 7.38 12/04/2020    POCPCO2 64 12/04/2020    POCPO2 89 12/04/2020    POCHCO3 38.5 12/04/2020    NBEA NOT REPORTED 12/04/2020    PBEA 13 12/04/2020    XDV9TRD 40 12/04/2020    IVBC6UIH 96 12/04/2020    FIO2 75.0 12/04/2020     Lab Results   Component Value Date/Time    SPECIAL NOT REPORTED 12/04/2020 12:30 PM     Lab Results   Component Value Date/Time    CULTURE NORMAL SKIN FABIO (A) 12/04/2020 12:30 PM       Radiology:  Xr Hip Left (2-3 Views)    Result Date: 12/9/2020  No acute osseous abnormality. Xr Hip Right (2-3 Views)    Result Date: 12/9/2020  1. No acute bony or joint abnormality 2. Large amount of stool in the rectum 3. Degenerative changes in the right SI joint and right hip     Nm Lung Scan Perfusion Only    Result Date: 12/4/2020  Indeterminate for pulmonary embolism given presence of effusion and 2 moderate size perfusion defects. This does not meet criteria for high probability for pulmonary embolism. Xr Chest Portable    Result Date: 12/6/2020  No significant change in chest findings. Xr Chest Portable    Result Date: 12/5/2020  No significant change in chest findings compared to the prior study. CHF findings persist.     Xr Chest Portable    Result Date: 12/3/2020  Cardiomegaly with perihilar congestion. Bilateral effusions and airspace opacities favor atelectasis. Us Dup Lower Extremity Left Seferino    Result Date: 12/7/2020  Noncompressibility of the distal femoral vein and partial compressibility of the popliteal vein suggesting underlying thrombosis. The findings were sent to the Radiology Results [address] at 2:33 pm on 12/7/2020to be communicated to a licensed caregiver. Us Dup Lower Extremity Right Seferino    Result Date: 12/7/2020  No evidence of DVT in the right lower extremity.        Physical Examination:        General appearance:  alert, uncooperative at times, no acute distress  Mental Status:  oriented to person, place and time and normal affect  Lungs:  clear to auscultation bilaterally, normal effort  Heart:  regular rate and irregular rhythm, no murmur  Abdomen:  soft, nontender, nondistended, normal bowel sounds, no masses, hepatomegaly, splenomegaly  Extremities:  no edema, redness, tenderness in the calves, Limited ROM and generalized weakness which appears to be her baseline, however she does express bilateral hip discomfort with external rotation  Skin:  no gross lesions, rashes, induration    Assessment:        Hospital Problems           Last Modified POA    * (Principal) Acute on chronic diastolic congestive heart failure ([address] 12/8/2020 Yes    Hypertension 12/8/2020 Yes    Paroxysmal atrial fibrillation ([address] 12/8/2020 Yes    Atypical atrial flutter ([address] 12/8/2020 Yes    Overview Signed 12/4/2020  7:51 AM by EMY Terry CNP     Added automatically from request for surgery [id number]         Sleep apnea 12/8/2020 Yes    CKD (chronic kidney disease) 12/8/2020 Yes    Type 2 diabetes mellitus, without long-term current use of insulin ([address] 12/8/2020 Yes    Acute on chronic respiratory failure ([address] 12/8/2020 Yes    Overview Signed 12/4/2020  4:51 PM by EMY Terry CNP     2 l nc through bipap at night         Pneumonia due to organism 12/8/2020 Yes    Hyperlipidemia 12/8/2020 Yes    COPD (chronic obstructive pulmonary disease) ([address] 12/8/2020 Yes    Vertigo 12/8/2020 Yes    Morbid obesity ([address] 12/8/2020 Yes    Type 2 MI (myocardial infarction) ([address] 12/8/2020 Yes          Plan:        1. Continue Eliquis, by asa,oral lasix and amiodarone. Decrease coreg dose as her pressures have been on the low side with new medication changes. No Ace-I at this time as her BP will not tolerate it. Okay for discharge from cardiology standpoint  2.  Get bilateral hip xray's, patient has a history of breast cancer with bony mets and spinal stenosis but we will rule out fracture to be sure no other underlying process   3. Monitor labs, replace electrolytes as needed  4. Continue PT/OT, recommending SNF   5. Continue supplemental oxygen as needed, wean as tolerated. Bipap and aerosols ordered, patient continues to refuse   6. Discharge pack to SNF ([facility]) today if Xrays unremarkable  7.  Plan discussed with patient and staff     EMY Neely NP  12/9/2020  12:25 PM

## 2020-12-09 NOTE — PROGRESS NOTES
Patient adamantly refusing to use Bipap provided by the hospital.  States \"she knows the mask will hurt her nose\". Rational explained behind the reason to wear but she states that she doesn't care and won't wear it.

## 2020-12-09 NOTE — CARE COORDINATION
Social work: Patient to [employer] to [employer]  via [employer]  at [employer].  # for RN report: [phone]. Completed ANDREW will need faxed to [phone]. Informed RN, unit and facility of dc time, agreeable to plan.

## 2020-12-09 NOTE — PROGRESS NOTES
Report called to [facility] at [facility] [facility]. All questions answered. Patients gomez pulled at 1330 and d/t void by 2000.

## 2020-12-09 NOTE — PROGRESS NOTES
RT talked to patient about bringing a hospital bipap machine into the room until family can bring her home unit. Patient got aggravated and stated that she would not wear a hospital machine because they are uncomfortable and tight. RT offered several suggestions including bringing a mask for her to see and trying to adjust it so that it is not tight. Patient declined all offered suggestions and stated not to bring the machine into the room. RT told patient that her MD wanted her to wear bipap due to her increased CO2 levels and it not being healthy for the patient. Patient still stated that she would not wear hospital unit only her home unit. MARC Arnold at bedside and also encouraged patient to wear bipap. RT told patient if she changed her mind at any point a bipap can be brought in. Will continue to monitor.

## 2020-12-09 NOTE — PROGRESS NOTES
Occupational Therapy   Occupational Therapy Initial Assessment  Date: 2020   Patient Name: Teresa Templeton  MRN: [id number]     : 1950    Date of Service: 2020    Discharge Recommendations:  [address] RN reports patient is medically stable for therapy treatment this date. Chart reviewed prior to treatment and patient is agreeable for therapy. All lines intact and patient positioned comfortably at end of treatment. All patient needs addressed prior to ending therapy session. Assessment   Performance deficits / Impairments: Decreased functional mobility ; Decreased safe awareness;Decreased balance;Decreased ADL status; Decreased endurance;Decreased strength;Decreased sensation;Decreased high-level IADLs  Prognosis: Fair  Decision Making: Medium Complexity  OT Education: OT Role;Transfer Training;Equipment;Plan of Care;Family Education;Home Exercise Program;Precautions; ADL Adaptive Strategies; Energy Conservation  Patient Education: importance of being OOB  REQUIRES OT FOLLOW UP: Yes  Activity Tolerance  Activity Tolerance: Patient limited by pain  Safety Devices  Safety Devices in place: Yes  Type of devices: Call light within reach;Nurse notified; Left in bed;Bed alarm in place; Patient at risk for falls  Restraints  Initially in place: No           Patient Diagnosis(es): There were no encounter diagnoses. has a past medical history of CHF (congestive heart failure) ([facility] [address] CKD (chronic kidney disease), COPD (chronic obstructive pulmonary disease) ([facility] [address] Diabetes mellitus ([facility] [address] Hyperlipidemia, Hypertension, and Vertigo. has no past surgical history on file.            Restrictions  Restrictions/Precautions  Restrictions/Precautions: General Precautions, Fall Risk  Required Braces or Orthoses?: No  Position Activity Restriction  Other position/activity restrictions: up with assist, telemetry, heels off bed at all times, LUE IV, gomez catheter    Subjective General  Chart Reviewed: Yes  Patient assessed for rehabilitation services?: Yes  Response to previous treatment: Patient with no complaints from previous session  Family / Caregiver Present: No  Vital Signs  Temp: 98.6 °F (37 °C)  Temp Source: Oral  Pulse: 83  Heart Rate Source: Monitor  Resp: 18  BP: (!) 96/59  BP Location: Left Arm  Oxygen Therapy  SpO2: 93 %  O2 Device: Nasal cannula  Social/Functional History  Social/Functional History  Lives With: Alone  Type of Home: House  Home Layout: Two level, Performs ADL's on one level, Able to Live on Main level with bedroom/bathroom(pt reports doesn't access second floor)  Home Access: Ramped entrance  Bathroom Shower/Tub: Walk-in shower  Bathroom Toilet: Handicap height(used vanity for support)  Bathroom Equipment: Shower chair, Grab bars in shower  Home Equipment: Rolling walker  Receives Help From: Home health, Other (comment)(aide 7 days per week for wound care, hired someone to assist with IADLs)  ADL Assistance: Independent(HHA 7 days per week for wound care,  & hired someone to assist with IADLs)  Homemaking Assistance: Needs assistance(pt reports has hired someone to come in to help with cooking/meal prep/laundry/pet care/grocery shopping)  Homemaking Responsibilities: No  Ambulation Assistance: Independent(pt reports primarily gets around home by wheeling self in [employer]", occasionally uses RW for mobility)  Transfer Assistance: Independent(pt reports completing stand pivot transfers between chairs in home and occasionally using RW for transfers)  Active : Yes(pt reports person who helps with IADLs was driving for her before pt's cataracts surgery occurred)  Mode of Transportation: Car  Occupation: Retired  Leisure & Hobbies: keaton, playing on the computer  Additional Comments: Pt has had frequent falls, above home information is from her mobility prior to her recent hospitalization to [facility]. Pt had recent fall & was discharged from there to [facility] but was only there for a day or so before admiited to [facility] & transferred to [address] [place] [address] for further evaluation & workup       Objective   Vision: Impaired  Vision Exceptions: Wears glasses at all times  Hearing: Within functional limits    Orientation  Overall Orientation Status: Within Functional Limits  Observation/Palpation  Posture: Fair  Observation: supine on bed upon arrival, increased LLE pain with movement, O2 @ 2L NC, gomez,  Edema: LE's  Balance  Sitting Balance: Stand by assistance(CGA progressing to SBA during sitting on EOB, initially declining to sit on EOB as Pt reporting incerase pain in LLE, however participated with encouragement.)  ADL  Feeding: Setup; Independent; Modified independent   Grooming: Setup;Modified independent ; Independent  UE Bathing: Moderate assistance  LE Bathing: Maximum assistance  UE Dressing: Moderate assistance  LE Dressing: Maximum assistance  Toileting: Maximum assistance  Additional Comments: Pt required MAX x2 to complete pericare while supine on bed, attempted to stand with Louvenia Santos however unable, returned to supine and max cued for rolling with max x2 to hold onto bed railing, Pt poor follow through and agitated throughout session. Additional 3rd person assist to hold Pt on side lying position to complete nick care with Max A, Pt rolled to L side to remove soiled bed linen and pad, rolled to R side to replace sheets.   Tone RUE  RUE Tone: Normotonic  Tone LUE  LUE Tone: Normotonic  Coordination  Movements Are Fluid And Coordinated: Yes     Bed mobility  Rolling to Left: Maximum assistance;2 Person assistance  Rolling to Right: Maximum assistance;2 Person assistance  Supine to Sit: Maximum assistance;2 Person assistance  Scooting: Maximal assistance;Dependent/Total;2 Person assistance  Comment: Required MAX x2 for nick care as Pt soiled linen, additional 3rd person assist to hold Pt on side lying to clean and replace bed linen/pad. HOB lowered to decline position to assist for boosting as Pt unable to use BLE to push self up to bed. Max cueing for proper hand placement on bed railing to assist for mobility, additional tactile assist throughout. Transfers  Sit to stand: Unable to assess  Stand to sit: Unable to assess  Transfer Comments: Pt attempted to stand using Noemi Freeman for completing pericare and bed linen change with Max x2 A provided, unable to proper self to standing position, returned to supine on bed.      Cognition  Overall Cognitive Status: WFL  Perception  Overall Perceptual Status: WFL     Sensation  Overall Sensation Status: Impaired(neuropathy in feet, sometimes in hands as per Pt)        LUE AROM (degrees)  LUE AROM : WFL  Left Hand AROM (degrees)  Left Hand AROM: WFL  RUE AROM (degrees)  RUE AROM : WFL  Right Hand AROM (degrees)  Right Hand AROM: WFL  LUE Strength  Gross LUE Strength: Exceptions to [facility]  L Hand General: 3+/5  LUE Strength Comment: B  4-/5  RUE Strength  Gross RUE Strength: Exceptions to [facility]  R Hand General: 3+/5      Plan   Plan  Times per week: 4-5x/week, 1-2x/day  Current Treatment Recommendations: Strengthening, Endurance Training, Patient/Caregiver Education & Training, Equipment Evaluation, Education, & procurement, Self-Care / ADL, Balance Training, Functional Mobility Training, Safety Education & Training      AM-PAC Score        AM-[facility] Inpatient Daily Activity Raw Score: 15 (12/09/20 1207)  AM-PAC Inpatient ADL T-Scale Score : 34.69 (12/09/20 1207)  ADL Inpatient CMS 0-100% Score: 56.46 (12/09/20 1207)  ADL Inpatient CMS G-Code Modifier : CK (12/09/20 1207)    Goals  Short term goals  Time Frame for Short term goals: By discharge, Pt will  Short term goal 1: complete bed mobility with MODx1 using bed railing/controls  Short term goal 2: complete UB ADLs with SBA and LB ADLs with MIN A at appropriate level  Short term goal 3: complete ADL transfers with MODx1 using AE with good safety  Short term goal 4: increase standing level tolerance to +5 minutes with MODx1 to assist in ADL completion  Short term goal 5: verbalize and execute good understanding of fall prevention techs, EC/WS techs, pacing, compensatory strategies, proper AE/DME use  Patient Goals   Patient goals : to go home       Therapy Time   Individual Concurrent Group Co-treatment   Time In 0858(+10 minute chart review)         Time Out 0948         Minutes 48               Co-treatment with PT warranted secondary to decreased safety and independence requiring 2 skilled therapy professionals to address individual discipline's goals. OT addressing preparation for ADL transfer, sitting balance for increased ADL performance, sitting/activity tolerance, functional reaching, fall prevention and functional UE strength. Patient would benefit from SNF for continued occupational therapy to increase independence with  ADL of bathing, dressing, toileting and grooming. Writer recommending SNF placement for for activity tolerance and strength which will increase independence with ADL's coordinated with bed mobility and chair transfers. Continued skilled OT services to address decreased safety awareness with ADL and IADL tasks and for education and increased independence with DME and AE for fall prevention and ec/ws techniques prior to d/c home.       Tomas Daughters, Student

## 2020-12-09 NOTE — DISCHARGE SUMMARY
[facility]  Office: [phone]  Annia Bonilla DO, Robert Alvarado DO, Heather Plascencia DO, Gisel Arteaga DO, Kristyn Scott MD, Jeni Hansen MD, Ángela Manjarrez MD, Steven Villagran MD, Susana Hall MD, Solitario Wiggins MD, Ruiz Byers MD, Artem Willard MD, Ernestina Kraft MD, Nimisha Srivastava DO, Concetta Thurston MD, Boy Rich MD, Ariel Martinez DO, Elvis Manjarrez MD,  Dileep Kaur DO, Daryl Cardozo MD, Rai Peters MD, Romi Ceja, [facility], [facility], [facility], Lugene Nose, CNP, Nicolle Otto, CNS, Maria De Jesus Cruz, CNP, Daryl Grain, CNP, Katherine Little, CNP, Mattie Fried, CNP, Michael Marrero, CNP, Xochilt Boles PA-C, Tiara Gilbert, SAAD, Juwan Meadows, CNP, Pako Erickson, CNP, Cristofer Good, CNP, Tre Robert, [facility], [facility]    Discharge Summary     Patient ID: Allan Krabbe  :  1950   MRN: [id number]     ACCOUNT:  [de-identified]   Patient's PCP: Tammie Blake MD  Admit Date: 2020   Discharge Date: 2020     Length of Stay: 1  Code Status:  Full Code  Admitting Physician: Ranulfo Arteaga DO  Discharge Physician: EMY Lloyd NP     Active Discharge Diagnoses:     Hospital Problem Lists:  Principal Problem (Resolved):    Acute on chronic diastolic congestive heart failure [facility])  Active Problems:    Hypertension    Paroxysmal atrial fibrillation (HCC)    Atypical atrial flutter (HCC)    Sleep apnea    CKD (chronic kidney disease)    Type 2 diabetes mellitus, without long-term current use of insulin ([facility]    Acute on chronic respiratory failure ([facility]    Pneumonia due to organism    Hyperlipidemia    COPD (chronic obstructive pulmonary disease) ([facility]    Vertigo    Mor[facility]    Type 2 MI (myocardial infarction) ([facility]    Pulmonary emboli ([facility]      Admission Condition:  fair     Discharged Condition: stable    Hospital Stay:     Hospital Course:  Allan Krabbe is a 78 y.o. female who was admitted for the management of  Acute on chronic diastolic congestive heart failure ([facility] , presented to ER with shortness of breath. transferred from [facility] where she was admitted to the hospital for the management of Acute on chronic diastolic congestive heart failure ([facility] Patient initially presented to [facility] ER due to hypoxia with oxygen levels in the 70s per EMS while at the nursing home where she was for acute inpatient rehab. She was found to be in A. fib with RVR as well as an acute CHF exacerbation. She was placed on high flow oxygen, started on IV Cardizem which was later switched to oral Coreg and started on IV Rocephin and Zithromax which has since been discontinued as her symptoms were more consistent with CHF, pneumonia unlikely. IV Lasix was also initiated. VQ scan consistent with PE, and she was initially started on heparin drip which has since been switched to Eliquis per cardiology. Per cardiology notes there has been a new drop in patient's EF from 50 to 55% in May 2020 to 30 to 35% during this admission, she was [facility] for cardiac cath and further management. Cardiac cath done on 12/8 2020 which revealed mild non-obstructive CAD, normal LVEDP. Medical treatment recommended. Patients acute respiratory failure has resolved however I do believe she has some underlying chronic respiratory failure with hypercapnia and hypoxiemia due to COPD, shaggy on cpap. Patient has been noncompliant with medical therapies at times     Patient was transitioned to oral lasix as he CHF is now compensated. Her coreg was decreased due to drop in blood pressures with new addition of cardiac meds.  She is not a candidate for ACE-I at this time as her BP will not tolerate it    PT/OT recommending SNF/acute rehab     She is stable for discharge   Significant therapeutic interventions: see above     Significant Diagnostic Studies:   Labs / Micro:  CBC:   Lab Results   Component Value Date    WBC 7.9 12/09/2020    RBC 4.35 12/09/2020    HGB 13.1 12/09/2020    HCT 42.4 12/09/2020    MCV 97.5 12/09/2020    MCH 30.1 12/09/2020    MCHC 30.9 12/09/2020    RDW 14.6 12/09/2020     12/09/2020     BMP:    Lab Results   Component Value Date    GLUCOSE 141 12/09/2020     12/09/2020    K 3.8 12/09/2020    CL 94 12/09/2020    CO2 45 12/09/2020    ANIONGAP 4 12/09/2020    BUN 37 12/09/2020    CREATININE 0.86 12/09/2020    BUNCRER 43 12/09/2020    CALCIUM 9.1 12/09/2020    LABGLOM >60 12/09/2020    GFRAA >60 12/09/2020    GFR      12/09/2020    GFR NOT REPORTED 12/09/2020     U/A:  No results found for: [place], SPECGRAV, HGBUR, PHUR, PROTEINU, GLUCOSEU, KETUA, BILIRUBINUR, UROBILINOGEN, NITRU, LEUKOCYTESUR  TSH:    Lab Results   Component Value Date    TSH 0.14 12/05/2020     Radiology:  Xr Hip Left (2-3 Views)    Result Date: 12/9/2020  No acute osseous abnormality. Xr Hip Right (2-3 Views)    Result Date: 12/9/2020  1. No acute bony or joint abnormality 2. Large amount of stool in the rectum 3. Degenerative changes in the right SI joint and right hip     Nm Lung Scan Perfusion Only    Result Date: 12/4/2020  Indeterminate for pulmonary embolism given presence of effusion and 2 moderate size perfusion defects. This does not meet criteria for high probability for pulmonary embolism. Xr Chest Portable    Result Date: 12/6/2020  No significant change in chest findings. Xr Chest Portable    Result Date: 12/5/2020  No significant change in chest findings compared to the prior study. CHF findings persist.     Xr Chest Portable    Result Date: 12/3/2020  Cardiomegaly with perihilar congestion. Bilateral effusions and airspace opacities favor atelectasis.      Us Dup Lower Extremity Left Seferino    Result Date: 12/7/2020  Noncompressibility of the distal femoral vein and partial compressibility of the popliteal vein suggesting underlying

## 2020-12-09 NOTE — PROGRESS NOTES
[facility]  Progress Note                   Date:   12/9/2020  Patient name: Iram Otero  Date of admission:  12/8/2020  3:21 AM  MRN:   [id number]  YOB: 1950  PCP: Jose Antonio Sanderson MD    Reason for Admission: Congestive heart failure of unknown etiology ([facility] [I50.9]    Subjective:       Clinical Changes /Abnormalities: Seen & examined in room with OT. Denies chest pain or SOB. No post cath (radial) site complications. Labs, vitals, & tele reviewed. -2.3L since admission    Review of Systems    Medications:   Scheduled Meds:   ipratropium-albuterol  1 ampule Inhalation Q4H WA    carvedilol  25 mg Oral BID WC    amiodarone  200 mg Oral BID    apixaban  5 mg Oral BID    aspirin  81 mg Oral Daily    docusate sodium  100 mg Oral Daily    furosemide  40 mg Oral Daily    insulin lispro  0-12 Units Subcutaneous TID WC    insulin lispro  0-6 Units Subcutaneous Nightly    latanoprost  1 drop Both Eyes Nightly    megestrol  40 mg Oral BID    pantoprazole  40 mg Oral QAM AC    predniSONE  20 mg Oral Daily    Followed by   Jose Antonio Sanderson ON 12/10/2020] predniSONE  15 mg Oral Daily    Followed by   Argeliailla Mauoty ON 12/13/2020] predniSONE  10 mg Oral Daily    Followed by   Equilla Booty ON 12/16/2020] predniSONE  5 mg Oral Daily    sodium chloride flush  10 mL Intravenous 2 times per day     Continuous Infusions:   dextrose       CBC:   Recent Labs     12/07/20  0623 12/09/20  0609   WBC 7.1 7.9   HGB 12.4 13.1   * 122*     BMP:    Recent Labs     12/07/20  0623 12/08/20  1257 12/09/20  0609    141 143   K 3.9 3.4* 3.8   CL 91* 91* 94*   CO2 42* 44* 45*   BUN 43* 37* 37*   CREATININE 1.12* 0.87 0.86   GLUCOSE 224* 139* 141*     Hepatic:No results for input(s): AST, ALT, ALB, BILITOT, ALKPHOS in the last 72 hours. Troponin: No results for input(s): TROPHS in the last 72 hours. BNP: No results for input(s): BNP in the last 72 hours.   Lipids: No results for input(s): CHOL, HDL in the last 72 hours. Invalid input(s): LDLCALCU  INR: No results for input(s): INR in the last 72 hours. Objective:   Vitals: BP (!) 108/40   Pulse 96   Temp 98.9 °F (37.2 °C) (Oral)   Resp 16   Ht 5' 7.52\" (1.715 m)   Wt 266 lb 8 oz (120.9 kg)   SpO2 93%   BMI 41.10 kg/m²   General appearance: alert and cooperative with exam  HEENT: Head: Normocephalic, no lesions, without obvious abnormality. Neck:no JVD, trachea midline, no adenopathy  Lungs: Clear to auscultation right side, fine rales noted LLL. On NC oxygen without distress  Heart: Irregular rate and rhythm, s1/s2 auscultated, no murmurs Afib   Abdomen: soft, non-tender, bowel sounds active, obese  Extremities: no edema  Neurologic: not done      EKG 12/4/2020:   Atrial fib, incomplete RBBB      TTE 12/3/2020  Left ventricle is dilated. Global left ventricular systolic function is  moderate to severely reduced Estimated ejection fraction is 30-35 %. Mild left ventricular hypertrophy. Leftward compression of inter-ventricular septum (\"D-sign\") indicating RV  pressure overload. False tendon seen the apex of the heart. Left atrium is severely dilated. Inter-atrial septum appears so be intact. Right atrium is severely dilated . Severely dilated right ventricular cavity. Reduced right ventricular function. Aortic leaflet calcification with mild stenosis. Moderate aortic insufficiency. Aortic valve is trileaflet. Normal mitral valve structure and function. Mitral annular calcification is seen. Mild-moderate mitral regurgitation.   Cardiac Cath 12/8/20  Findings:  Left main: NL     LAD: calcified, mid 40% stenosis   D1 is large, patent with mild disease     LCX: Large vessel with 10-20% stenosis  OM1 is small, patent, ostial 40% stenosis   OM2 is large, normal       RCA: Large, dominant, 10-20% stenosis   RPDA/RPL are moderate sized vessels and patent with mild disease     The LV gram was not performed due to CKD.      LVEDP 2 mm hg

## 2020-12-09 NOTE — DISCHARGE INSTR - COC
Continuity of Care Form    Patient Name: Elba Castellanos   :  1950  MRN:  [id number]    Admit date:  2020  Discharge date:  2020    Code Status Order: Full Code   Advance Directives:      Admitting Physician:  Charlene Arteaga DO  PCP: Brii Torres MD    Discharging Nurse: [facility] Unit/Room#: 8973/8993-90  Discharging Unit Phone Number: [phone]    Emergency Contact:   Extended Emergency Contact Information  Primary Emergency Contact: Zakia Yanez  Home Phone: [phone]  Mobile Phone: [phone]  Relation: Niece/Nephew    Past Surgical History:  No past surgical history on file. Immunization History: There is no immunization history on file for this patient. Active Problems:  Patient Active Problem List   Diagnosis Code    Hypoxia R09.02    Chronic obstructive pulmonary disease with acute exacerbation ([facility] J44.1    Hypertension I10    Malignant neoplasm of bone ([facility]) C41.9    Neuropathy G62.9    Paroxysmal atrial fibrillation ([facility]) I48.0    Atypical atrial flutter ([facility]) I48.4    Sleep apnea G47.30    CKD (chronic kidney disease) N18.9    Type 2 diabetes mellitus, without long-term current use of insulin ([facility]) E11.9    Acute on chronic combined systolic and diastolic congestive heart failure ([facility]) I50.43    Pneumonia due to infectious organism J18.9    Acute on chronic respiratory failure ([facility]) J96.20    Pneumonia due to organism J18.9    Congestive heart failure of unknown etiology ([facility]) I50.9    Hyperlipidemia E78.5    COPD (chronic obstructive pulmonary disease) ([facility]) J44.9    Acute on chronic diastolic congestive heart failure ([facility]) I50.33    Type 2 diabetes mellitus ([facility]) E11.9    Vertigo R42    Morbid obesity ([facility]) E66.01    Type [facility] I21. A1       Isolation/Infection:   Isolation            No Isolation          Patient Infection Status       None to display            Nurse Assessment:  Last Vital Signs: BP (!) 108/40   Pulse 96   Temp 98.9 °F (37.2 °C) (Oral)   Resp 16   Ht 5' 7.52\" (1.715 m)   Wt 266 lb 8 oz (120.9 kg)   SpO2 93%   BMI 41.10 kg/m²     Last documented pain score (0-10 scale): Pain Level: 0  Last Weight:   Wt Readings from Last 1 Encounters:   12/08/20 266 lb 8 oz (120.9 kg)     Mental Status:  oriented    IV Access:  - None    Nursing Mobility/ADLs:  Walking   Dependent  Transfer  Dependent  Bathing  Assisted  Dressing  Dependent  Toileting  Assisted  Feeding  Assisted  Med Admin  Assisted  Med Delivery   whole    Wound Care Documentation and Therapy:        Elimination:  Continence:   · Bowel: Yes  · Bladder: No  Urinary Catheter: Removal Date 12/9/2020   Colostomy/Ileostomy/Ileal Conduit: No       Date of Last BM: 12/8/2020    Intake/Output Summary (Last 24 hours) at 12/9/2020 0949  Last data filed at 12/9/2020 0507  Gross per 24 hour   Intake 800 ml   Output 2350 ml   Net -1550 ml     I/O last 3 completed shifts: In: 800 [P.O.:800]  Out: 2700 [Urine:2700]    Safety Concerns: At Risk for Falls    Impairments/Disabilities:      None    Nutrition Therapy:  Current Nutrition Therapy:   - Oral Diet:  Cardiac    Routes of Feeding: Oral  Liquids: No Restrictions  Daily Fluid Restriction: no  Last Modified Barium Swallow with Video (Video Swallowing Test): not done    Treatments at the Time of Hospital Discharge:   Respiratory Treatments: as needed  Oxygen Therapy:  is on oxygen at 2 L/min per nasal cannula. Ventilator:    - CPAP   as prior with 2 liters. Rehab Therapies: Physical Therapy and Occupational Therapy  Weight Bearing Status/Restrictions: No weight bearing restirctions  Other Medical Equipment (for information only, NOT a DME order):  walker  Other Treatments: dressing changes to left buttocks and chin as prior.     Patient's personal belongings (please select all that are sent with patient):  Kristal    RN SIGNATURE:  Electronically signed by Altagracia Kelley RN on 12/9/20 at 2:30 PM EST    CASE MANAGEMENT/SOCIAL WORK SECTION    Inpatient Status Date: 12/8/2020    Readmission Risk Assessment Score:  Readmission Risk              Risk of Unplanned Readmission:        21           Discharging to Facility/ Agency    Name: You have [facility] the following [facility]  [facility] Address: [address] [place] [address], [address] [place] [address] Road   Phone:  [phone]    / signature: Electronically signed by LUKAS Victoria on 12/9/20 at 9:49 AM EST    PHYSICIAN SECTION    Prognosis: Fair    Condition at Discharge: Stable    Rehab Potential (if transferring to Rehab): Fair    Recommended Labs or Other Treatments After Discharge: None    Physician Certification: I certify the above information and transfer of Allan Krabbe  is necessary for the continuing treatment of the diagnosis listed and that she requires [facility] for less than 30 days.      Update Admission H&P: No change in H&P    PHYSICIAN SIGNATURE:  Electronically signed by Harman García DO on 12/9/20 at 12:31 PM EST

## 2020-12-10 LAB
CULTURE: NORMAL
CULTURE: NORMAL
Lab: NORMAL
Lab: NORMAL
SPECIMEN DESCRIPTION: NORMAL
SPECIMEN DESCRIPTION: NORMAL

## 2020-12-13 NOTE — DISCHARGE SUMMARY
[facility]  Office: [address], DO, Raquel Goldman, DO, Tano Persaud, DO, Mae Arteaga, DO, Yvette Cole MD, Savannah Hernández MD, Martha Cleaning MD, Mily Pemberton MD, Mady Otero MD, Marciano Hilliard MD, Shahrzad Schilling MD, Regino Persaud MD, Ernestina Barlow MD, Susanna Hillman DO, Leandra Richard MD, Franciso Schaumann, MD, Jerrica Krishnamurthy DO, Case Hawk MD,  Zuly Gao DO, Madelin Alcaraz MD, Denman Najjar, MD, Sophy Bustamante [facility], [facility], CNP, Jenn Weston, CNP, Sabrina Bell, CNS, Shimon Hansen, CNP, Iggy Manzo, CNP, Judy Avila, CNP, Rose Link, CNP, Chucho Polanco, CNP, Puneet Reis PA-C, Andrew Lee, [facility], Stephanie Graves, CNP, Stevan Harding, CNP, Angélica Hannah, CNP, Olga Bravo CNP, Amos Vincent 5362    Discharge Summary     Patient ID: Cliff Felipe  :  1950   MRN: [id number]     ACCOUNT:  [de-identified]   Patient's PCP: Haydee Biswas MD  Admit Date: 12/3/2020   Discharge Date:  2020  Length of Stay: 4  Code Status:  Prior  Admitting Physician: Regino Persaud MD  Discharge Physician: EMY Mabry CNP     Active Discharge Diagnoses:     Hospital Problem Lists:  Principal Problem:    Hypoxia  Active Problems:    Chronic obstructive pulmonary disease with acute exacerbation (HCC)    Hypertension    Paroxysmal atrial fibrillation (HCC)    Sleep apnea    CKD (chronic kidney disease)    Type 2 diabetes mellitus, without long-term current use of insulin (HCC)    Acute on chronic combined systolic and diastolic congestive heart failure (HCC)    Pneumonia due to infectious organism    Acute on chronic respiratory failure ([facility] [address]    Pneumonia due to organism  Resolved Problems:    * No resolved hospital problems.  *      Admission Condition:  poor     Discharged Condition: fair    Hospital Stay:     Hospital Course:  Deborrah Sauce a 13 y.o. Non-/non  female who presents with Shortness of Breath (70s per ems from nursing home)   and is admitted to the hospital for the management of Hypoxia.     The patient was brought to the ER per EMS related to hypoxia. The patient states she was just discharged to a facilty a hospitalization in [facility]. She presented there because of pain in her left foot, frequent falls, and weakness. She was found to be in A fib with RVR. A chest xray showed right pleural effusion and atelectasis or infiltrate. BNP during that visit less than 900. She was discharged on bumex. According the ER note the patients sats were in the 70's at the facility. She denies feeling particularly sob. She denies fever, chills, N/V. No chest pain or palpitations. She does have chronic wounds to her chin and buttocks that she follows with wound clinic. All of her doctors are from [facility] including Dr Eloy Nath, Dr More Maria and Dr Morris Zhu. She does continue to have monthly injections per oncology for bone mets. She states she wears 2 l nc through her bipap. The patient states she is compliant with hr medications.      In the ER she was placed on highflo, started on Rocephin and Zithromax IV, Lovenox at 0.5 mg/kg. CXR showsCardiomegaly with perihilar congestion. Bilateral effusions and airspace opacities favor atelectasis  A ct was not completed r/t creatine of 2.4. a VQ scan scan this morning was Indeterminate for pulmonary embolism. After obtaining her Echo that showed Leftward compression of inter-ventricular septum (\"D-sign\") indicating RV pressure overload. False tendon seen the apex of the heart we started her high intensity heparin protocol. Eliquis on hold for now. Pulmonary consulted.      Per Dr Domenico Bolden to restart eliquis and dc heparin gtt from pulmonary standpoint if no objection by cardiology in light of valvular Afib . Dc antibiotics - not pneumonia.  Per Dr. Crocker [place] LVEF on Cardizem will DC at this time, plan to start on Coreg once the blood pressure is improved. Ok to stop heparin and resume Eliquis. will continue Lasix. Noncompressibility of the distal femoral vein and partial compressibility of the popliteal vein suggesting underlying thrombosis. I spoke with the radiologist and he said without previous studies its difficult to say wether or not this acute or chronic. I spoke with Leona Linn at [facility] cardiology [place]. Per the report she read the patient had an acute popliteal thrombosis per doppler study on 4/10/2018. Report placed on chart     Cardizem and coreg restarted. Lasix decreased to 40mg po daily. Planning for discharge soon pending cardiology and pulmonary recommendations. later in the day Dr Bree Orozco wrote Persistent Afib/Flutter- no cardizem, given low EF. Will optimize meds, started po amnio, and noted New drop in EF from 50-55% in May 2020 to 30-35% yesterday. Will need cath. [facility] for last echo results. Will need transfer to [facility]. [facility] had no beds and they are on bypass. Dr Bree Orozco ok's transfer to [facility]. Plan to speak with one of our partners about tranfer.       Significant therapeutic interventions: Diuresis, stopped Cardizem started amnio, solumedrol    Significant Diagnostic Studies:   Labs / Micro:  CBC:   Lab Results   Component Value Date    WBC 7.9 12/09/2020    RBC 4.35 12/09/2020    HGB 13.1 12/09/2020    HCT 42.4 12/09/2020    MCV 97.5 12/09/2020    MCH 30.1 12/09/2020    MCHC 30.9 12/09/2020    RDW 14.6 12/09/2020     12/09/2020     BMP:    Lab Results   Component Value Date    GLUCOSE 141 12/09/2020     12/09/2020    K 3.8 12/09/2020    CL 94 12/09/2020    CO2 45 12/09/2020    ANIONGAP 4 12/09/2020    BUN 37 12/09/2020    CREATININE 0.86 12/09/2020    BUNCRER 43 12/09/2020    CALCIUM 9.1 12/09/2020    LABGLOM >60 12/09/2020    GFRAA >60 12/09/2020    GFR      12/09/2020    GFR NOT REPORTED 12/09/2020     HFP:  No results found for: ALB, PROT  CMP:    Lab Results   Component Value Date    GLUCOSE 141 12/09/2020     12/09/2020    K 3.8 12/09/2020    CL 94 12/09/2020    CO2 45 12/09/2020    BUN 37 12/09/2020    CREATININE 0.86 12/09/2020    ANIONGAP 4 12/09/2020    LABGLOM >60 12/09/2020    GFRAA >60 12/09/2020    GFR      12/09/2020    GFR NOT REPORTED 12/09/2020    CALCIUM 9.1 12/09/2020     PTT:   Lab Results   Component Value Date    APTT 29.4 12/04/2020     FLP:    Lab Results   Component Value Date    CHOL 80 12/05/2020    TRIG 68 12/05/2020    HDL 30 12/05/2020     TSH:    Lab Results   Component Value Date    TSH 0.14 12/05/2020        Radiology:  Xr Hip Left (2-3 Views)    Result Date: 12/9/2020  No acute osseous abnormality. Xr Hip Right (2-3 Views)    Result Date: 12/9/2020  1. No acute bony or joint abnormality 2. Large amount of stool in the rectum 3. Degenerative changes in the right SI joint and right hip     Us Dup Lower Extremity Left Seferino    Result Date: 12/7/2020  Noncompressibility of the distal femoral vein and partial compressibility of the popliteal vein suggesting underlying thrombosis. The findings were sent to the Radiology Results [address] at 2:33 pm on 12/7/2020to be communicated to a licensed caregiver. Us Dup Lower Extremity Right Seferino    Result Date: 12/7/2020  No evidence of DVT in the right lower extremity. Consultations:    Consults:     Final Specialist Recommendations/Findings:   IP CONSULT TO HEART FAILURE NURSE/COORDINATOR  IP CONSULT TO DIETITIAN  IP CONSULT TO PULMONOLOGY  IP CONSULT TO CARDIOLOGY      The patient was seen and examined on day of discharge and this discharge summary is in conjunction with any daily progress note from day of discharge. Discharge plan:     Disposition: discharge readmit to [facility]    Physician Follow Up:      Sr[address] C[address]  [phone]      December 15th 2:30    Brian Darnell MD  [address] [place]      1-2 weeks    Gina Ortiz DO  [address] [facility] [address]  [phone]      1-2 weeks    Rosa Hernandez [facility]. [place]   [phone]      follow up with PCP in 7-10 days           Diet: cardiac diet, diabetic diet and low fat, low cholesterol diet    Activity: As tolerated      Discharge Medications:      Medication List      ASK your doctor about these medications    apixaban 5 MG Tabs tablet  Commonly known as: ELIQUIS     aspirin 81 MG EC tablet     Biotin 1 MG Caps     denosumab 120 MG/1.7ML Soln SC injection  Commonly known as: XGEVA     dilTIAZem 240 MG extended release capsule  Commonly known as: DILACOR XR     FULVESTRANT IM     latanoprost 0.005 % ophthalmic solution  Commonly known as: XALATAN     megestrol 40 MG tablet  Commonly known as: MEGACE     metFORMIN 500 MG tablet  Commonly known as: GLUCOPHAGE     omeprazole 20 MG delayed release capsule  Commonly known as: PRILOSEC     triamcinolone 0.025 % cream  Commonly known as: KENALOG            No discharge procedures on file. Time Spent on discharge is  32 mins in patient examination, evaluation, counseling as well as medication reconciliation, prescriptions for required medications, discharge plan and follow up. Electronically signed by   EMY Mcdowell CNP  12/13/2020  3:06 PM      Thank you Dr. Monae Davis MD for the opportunity to be involved in this patient's care.

## 2021-01-26 ENCOUNTER — OFFICE VISIT (OUTPATIENT)
Dept: INFECTIOUS DISEASES | Age: 71
End: 2021-01-26
Payer: MEDICARE

## 2021-01-26 VITALS
SYSTOLIC BLOOD PRESSURE: 116 MMHG | DIASTOLIC BLOOD PRESSURE: 65 MMHG | HEART RATE: 64 BPM | HEIGHT: 67 IN | OXYGEN SATURATION: 92 % | BODY MASS INDEX: 34.69 KG/M2 | WEIGHT: 221 LBS | RESPIRATION RATE: 18 BRPM | TEMPERATURE: 97.2 F

## 2021-01-26 DIAGNOSIS — M27.2 OSTEOMYELITIS OF MANDIBLE: Primary | ICD-10-CM

## 2021-01-26 DIAGNOSIS — C41.9: ICD-10-CM

## 2021-01-26 DIAGNOSIS — C79.51 CARCINOMA OF BREAST METASTATIC TO BONE, UNSPECIFIED LATERALITY (HCC): ICD-10-CM

## 2021-01-26 DIAGNOSIS — L98.8 DRAINING CUTANEOUS SINUS TRACT: ICD-10-CM

## 2021-01-26 DIAGNOSIS — C50.919 CARCINOMA OF BREAST METASTATIC TO BONE, UNSPECIFIED LATERALITY (HCC): ICD-10-CM

## 2021-01-26 PROCEDURE — 1036F TOBACCO NON-USER: CPT | Performed by: INTERNAL MEDICINE

## 2021-01-26 PROCEDURE — G8427 DOCREV CUR MEDS BY ELIG CLIN: HCPCS | Performed by: INTERNAL MEDICINE

## 2021-01-26 PROCEDURE — G8484 FLU IMMUNIZE NO ADMIN: HCPCS | Performed by: INTERNAL MEDICINE

## 2021-01-26 PROCEDURE — 1123F ACP DISCUSS/DSCN MKR DOCD: CPT | Performed by: INTERNAL MEDICINE

## 2021-01-26 PROCEDURE — G8419 CALC BMI OUT NRM PARAM NOF/U: HCPCS | Performed by: INTERNAL MEDICINE

## 2021-01-26 PROCEDURE — 99204 OFFICE O/P NEW MOD 45 MIN: CPT | Performed by: INTERNAL MEDICINE

## 2021-01-26 PROCEDURE — 3017F COLORECTAL CA SCREEN DOC REV: CPT | Performed by: INTERNAL MEDICINE

## 2021-01-26 PROCEDURE — 4040F PNEUMOC VAC/ADMIN/RCVD: CPT | Performed by: INTERNAL MEDICINE

## 2021-01-26 PROCEDURE — 1090F PRES/ABSN URINE INCON ASSESS: CPT | Performed by: INTERNAL MEDICINE

## 2021-01-26 PROCEDURE — G8400 PT W/DXA NO RESULTS DOC: HCPCS | Performed by: INTERNAL MEDICINE

## 2021-01-26 RX ORDER — OXYCODONE HYDROCHLORIDE AND ACETAMINOPHEN 5; 325 MG/1; MG/1
1 TABLET ORAL EVERY 4 HOURS PRN
COMMUNITY

## 2021-01-26 RX ORDER — ZOLPIDEM TARTRATE 5 MG/1
5 TABLET ORAL NIGHTLY PRN
COMMUNITY

## 2021-01-26 ASSESSMENT — ENCOUNTER SYMPTOMS
RESPIRATORY NEGATIVE: 1
GASTROINTESTINAL NEGATIVE: 1

## 2021-01-26 NOTE — PROGRESS NOTES
[facility]   Office Consult Note  Today's Date and Time: 1/26/2021, 3:22 PM    Impression:     1. Osteomyelitis of mandible    2. Draining cutaneous sinus tract    3. Primary bone cancer ([facility] [address]    4. 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 · There is clear evidence of infection with a draining sinus tract. · The question is whether this is related to her metastatic breast cancer. · Nonetheless to really treat this the patient would require aggressive surgical debridement with IV antimicrobial therapy. · If the defect/infection is related to malignancy this would not be an ideal situation to do this. · At this point in time this infection is currently controlled and she has had this for at least 4 months and there are no major/overt findings to suggest extensive soft tissue infection  · I have recommended that she continue the current care. · There has been some talk about potential evaluation at [facility]  · I would only seek aggressive treatment with IV antimicrobial therapy if there is plan for aggressive debridement  · For now continue the current local care. I have ordered the following medications/ labs:  No orders of the defined types were placed in this encounter. No orders of the defined types were placed in this encounter. Chief complaint/reason for consultation:     Chief Complaint   Patient presents with    Wound Infection     new patient        History of Present Illness:   Flower Arango is a 78y.o.-year-old female who is seen at there request of No ref.  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 She has previously been followed at the wound care center at [facility] for a left gluteus wound as well as a right-sided chin wound. The patient reports that she is now at [id number] [id number] [facility] in [facility] where she is now doing wound care and has not followed up at [facility] for some time now. The patient has had an open wound in the floor of the mouth for many months now and has a draining sinus tract on the submental aspect of the mouth on the right side associated with it. The patient had a CT scan of the maxillofacial on October 19, 2020 that showed a soft tissue ulceration with air density extending to involve the chin subjacent to the bony cortex of the symphysis of the mandible. There is loss of cortex involving the symphysis of the mandible with mixed density involving the symphysis of the mandible and proximal body of the mandible bilaterally suspicious for osteomyelitis    Due to the above CAT scan the patient was asked to see us in October 2020 and is finally able to make the appointment today. The patient does not report any subjective fever or chills. No cough or shortness of breath. She does report an open wound in the floor of the mouth with the draining submental wound. I have personally reviewed the past medical history,past surgical history, medications, social history, and family history, and I have updated the database accordingly. PastMedical History:     Past Medical History:   Diagnosis Date    CHF (congestive heart failure) (HCC)     CKD (chronic kidney disease)     COPD ([facility]ronic obstructive pulmonary disease) ([facility] [address]     [facility] mellitus [facility]     Hyperlipidemia     Hypertension     Vertigo      Past Surgical  History:   No past surgical history on file.   Medications: Current Outpatient Medications   Medication Sig Dispense Refill    oxyCODONE-acetaminophen (PERCOCET) 5-325 MG per tablet Take 1 tablet by mouth every 4 hours as needed for Pain.  zolpidem (AMBIEN) 5 MG tablet Take 5 mg by mouth nightly as needed for Sleep.  amiodarone (CORDARONE) 200 MG tablet Take 1 tablet by mouth 2 times daily      docusate sodium (COLACE, DULCOLAX) 100 MG CAPS Take 100 mg by mouth daily      furosemide (LASIX) 40 MG tablet Take 1 tablet by mouth daily 60 tablet 3    albuterol (PROVENTIL) (2.5 MG/3ML) 0.083% nebulizer solution Take 3 mLs by nebulization every 2 hours as needed for Wheezing 120 each 3    ipratropium-albuterol (DUONEB) 0.5-2.5 (3) MG/3ML SOLN nebulizer solution Inhale 3 mLs into the lungs every 4 hours (while awake) 360 mL     apixaban (ELIQUIS) 5 MG TABS tablet Take 5 mg by mouth 2 times daily      aspirin 81 MG EC tablet Take 81 mg by mouth daily      Biotin 1 MG CAPS Take 1,000 mcg by mouth      denosumab (XGEVA) 120 MG/1.7ML SOLN SC injection Inject 120 mg into the skin every 30 days Given on the 16th of the month subq      FULVESTRANT IM Inject 500 mg into the muscle every 30 days On the 16th of the month      latanoprost (XALATAN) 0.005 % ophthalmic solution Place 1 drop into both eyes nightly      megestrol (MEGACE) 40 MG tablet Take 40 mg by mouth 2 times daily      metFORMIN (GLUCOPHAGE) 500 MG tablet Take 500 mg by mouth 2 times daily (with meals)      omeprazole (PRILOSEC) 20 MG delayed release capsule Take 20 mg by mouth daily       No current facility-administered medications for this visit.        Social History:     Social History     Socioeconomic History    Marital status: Single     Spouse name: Not on file    Number of children: Not on file    Years of education: Not on file    Highest education level: Not on file   Occupational History    Not on file   Social Needs    Financial resource strain: Not on file    Food insecurity Worry: Not on file     Inability: Not on file    Transportation needs     Medical: Not on file     Non-medical: Not on file   Tobacco Use    Smoking status: Former Smoker    Smokeless tobacco: Never Used   Substance and Sexual Activity    Alcohol use: Not Currently    Drug use: Never    Sexual activity: Not Currently   Lifestyle    Physical activity     Days per week: Not on file     Minutes per session: Not on file    Stress: Not on file   Relationships    Social connections     Talks on phone: Not on file     Gets together: Not on file     Attends [employer] service: Not on file     Active member of club or organization: Not on file     Attends meetings of clubs or organizations: Not on file     Relationship status: Not on file    Intimate partner violence     Fear of current or ex partner: Not on file     Emotionally abused: Not on file     Physically abused: Not on file     Forced sexual activity: Not on file   Other Topics Concern    Not on file   Social History Narrative    Not on file     Family History:     Family History   Problem Relation Age of Onset    Hypertension Mother     Cancer Father       Allergies:   Betadine [povidone iodine] and Ciprofloxacin     Review of Systems:   Review of Systems   Constitutional: Negative. HENT: Positive for mouth sores. Respiratory: Negative. Cardiovascular: Negative. Gastrointestinal: Negative. Genitourinary: Negative. Musculoskeletal: Negative. Skin: Positive for wound. Neurological: Negative. Psychiatric/Behavioral: Negative. Physical Examination :   /65 (Site: Left Lower Arm, Position: Sitting)   Pulse 64   Temp 97.2 °F (36.2 °C) (Temporal)   Resp 18   Ht 5' 7\" (1.702 m)   Wt 221 lb (100.2 kg)   SpO2 92%   BMI 34.61 kg/m²     Physical Exam  HENT:      Head: Normocephalic. Mouth/Throat:      Mouth: Mucous membranes are moist.      Comments: The patient has no mandibular teeth. There is an open wound and exposed bone in the right side floor of the mouth as depicted in the picture below. There is a right-sided submandibular wound/draining sinus tract as depicted in the pictures below. Eyes:      General: No scleral icterus. Pupils: Pupils are equal, round, and reactive to light. Neck:      Musculoskeletal: Neck supple. Cardiovascular:      Rate and Rhythm: Normal rate. Heart sounds: Normal heart sounds. No murmur. Pulmonary:      Effort: Pulmonary effort is normal.      Breath sounds: Normal breath sounds. No wheezing or rales. Abdominal:      General: Bowel sounds are normal.      Palpations: Abdomen is soft. There is no mass. Tenderness: There is no abdominal tenderness. Musculoskeletal: Normal range of motion. General: No deformity. Lymphadenopathy:      Cervical: No cervical adenopathy. Skin:     General: Skin is warm and dry. Findings: No rash. Neurological:      Mental Status: She is alert and oriented to person, place, and time.                      Medical Decision Making:   I haveindependently reviewed the following labs:  CBC with Differential:  Lab Results   Component Value Date    WBC 7.9 12/09/2020    WBC 7.1 12/07/2020    HGB 13.1 12/09/2020    HGB 12.4 12/07/2020    HCT 42.4 12/09/2020    HCT 38.6 12/07/2020     12/09/2020     12/07/2020    LYMPHOPCT 8 12/03/2020    MONOPCT 5 12/03/2020     BMP:  Lab Results   Component Value Date     12/09/2020     12/08/2020    K 3.8 12/09/2020    K 3.4 12/08/2020    CL 94 12/09/2020    CL 91 12/08/2020    CO2 45 12/09/2020    CO2 44 12/08/2020    BUN 37 12/09/2020    BUN 37 12/08/2020    CREATININE 0.86 12/09/2020    CREATININE 0.87 12/08/2020    MG 1.8 12/06/2020    MG 1.5 12/05/2020     Hepatic Function Panel: No results found for: PROT, LABALBU, BILIDIR, IBILI, BILITOT, ALKPHOS, ALT, AST  No results found for: CRP  No results found for: SEDRATE      Imaging Studies: The patient does have CT imaging which was reported above    Cultures:   Wound culture from 10/21/2020 has rare Serratia marcescens and heavy growth of hemolytic streptococci nongroup A    Thank you for allowing us to participate in the care of this patient. Pleasecall with questions. [address] [place] [address]  Perfect Serve messaging: [phone]    This note is created with the assistance of a speech recognition program.  While intending to generate a document that actually reflects the content ofthe visit, the document can still have some errors including those of syntax and sound a like substitutions which may escape proof reading. It such instances, actual meaning can be extrapolated by contextual diversion.

## 2021-02-02 ENCOUNTER — TELEPHONE (OUTPATIENT)
Dept: INFECTIOUS DISEASES | Age: 71
End: 2021-02-02

## 2021-02-02 NOTE — TELEPHONE ENCOUNTER
Baron Fell from [address] ([phone]) called and stated that you were wanting to send referral to Oral Surgeon Dr. Meyer Franklin ([phone]). May you please place referral so that I may fax #([phone])  it over to them.  Thank you -hawk

## 2021-02-03 DIAGNOSIS — M27.2 OSTEOMYELITIS OF MANDIBLE: Primary | ICD-10-CM

## 2021-04-16 ENCOUNTER — TELEPHONE (OUTPATIENT)
Dept: PULMONOLOGY | Age: 71
End: 2021-04-16

## 2021-04-19 ENCOUNTER — OFFICE VISIT (OUTPATIENT)
Dept: INFECTIOUS DISEASES | Age: 71
End: 2021-04-19
Payer: MEDICARE

## 2021-04-19 VITALS
WEIGHT: 221 LBS | TEMPERATURE: 96.2 F | RESPIRATION RATE: 16 BRPM | SYSTOLIC BLOOD PRESSURE: 111 MMHG | BODY MASS INDEX: 34.69 KG/M2 | HEIGHT: 67 IN | DIASTOLIC BLOOD PRESSURE: 62 MMHG | HEART RATE: 62 BPM | OXYGEN SATURATION: 94 %

## 2021-04-19 DIAGNOSIS — L98.8 DRAINING CUTANEOUS SINUS TRACT: ICD-10-CM

## 2021-04-19 DIAGNOSIS — M87.9 OSTEONECROSIS OF MANDIBLE (HCC): Primary | ICD-10-CM

## 2021-04-19 DIAGNOSIS — M27.2 OSTEOMYELITIS OF MANDIBLE: ICD-10-CM

## 2021-04-19 PROCEDURE — 1036F TOBACCO NON-USER: CPT | Performed by: INTERNAL MEDICINE

## 2021-04-19 PROCEDURE — 3017F COLORECTAL CA SCREEN DOC REV: CPT | Performed by: INTERNAL MEDICINE

## 2021-04-19 PROCEDURE — G8400 PT W/DXA NO RESULTS DOC: HCPCS | Performed by: INTERNAL MEDICINE

## 2021-04-19 PROCEDURE — 4040F PNEUMOC VAC/ADMIN/RCVD: CPT | Performed by: INTERNAL MEDICINE

## 2021-04-19 PROCEDURE — 1090F PRES/ABSN URINE INCON ASSESS: CPT | Performed by: INTERNAL MEDICINE

## 2021-04-19 PROCEDURE — G8427 DOCREV CUR MEDS BY ELIG CLIN: HCPCS | Performed by: INTERNAL MEDICINE

## 2021-04-19 PROCEDURE — G8417 CALC BMI ABV UP PARAM F/U: HCPCS | Performed by: INTERNAL MEDICINE

## 2021-04-19 PROCEDURE — 1123F ACP DISCUSS/DSCN MKR DOCD: CPT | Performed by: INTERNAL MEDICINE

## 2021-04-19 PROCEDURE — 99214 OFFICE O/P EST MOD 30 MIN: CPT | Performed by: INTERNAL MEDICINE

## 2021-04-19 RX ORDER — BUMETANIDE 1 MG/1
TABLET ORAL
COMMUNITY
Start: 2021-03-17

## 2021-04-19 RX ORDER — CHLORHEXIDINE GLUCONATE 0.12 MG/ML
RINSE ORAL
COMMUNITY
Start: 2021-04-16

## 2021-04-19 RX ORDER — OXYCODONE HYDROCHLORIDE AND ACETAMINOPHEN 5; 325 MG/1; MG/1
TABLET ORAL
COMMUNITY
Start: 2021-03-02

## 2021-04-19 RX ORDER — PENTOXIFYLLINE 400 MG/1
TABLET, EXTENDED RELEASE ORAL
COMMUNITY
Start: 2021-04-16

## 2021-04-19 RX ORDER — MULTIVIT WITH MINERALS/LUTEIN
1000 TABLET ORAL DAILY
COMMUNITY

## 2021-04-19 RX ORDER — ONDANSETRON HYDROCHLORIDE 8 MG/1
TABLET, FILM COATED ORAL
COMMUNITY
Start: 2021-03-22

## 2021-04-19 ASSESSMENT — ENCOUNTER SYMPTOMS
RESPIRATORY NEGATIVE: 1
GASTROINTESTINAL NEGATIVE: 1

## 2021-04-19 NOTE — PROGRESS NOTES
[facility]  Office Progress Note  Today's Date and Time: 4/19/2021, 1:58 PM    Impression:     1. Osteonecrosis of mandible ([address]    2. Osteomyelitis of mandible    3. Draining cutaneous sinus tract         Recommendations   · The recommendations from the [address] [place] [address],[address] oromaxillary surgery service were noted and they recommended IV antibiotics long-term. · I would reiterate that the IV antimicrobial therapy alone here would not resolve this infection and she would likely need surgical intervention as well. · The patient did though have a PICC line in place which had some complications and was subsequently removed and she does not want to have another PICC line. · At this point in time I will start her on oral antibiotic therapy with Augmentin 875 mg twice a day. · The plan is to do that for 6 weeks and the patient will follow up with me at that time and will assess her progress. I have ordered the following medications/ labs:  No orders of the defined types were placed in this encounter. No orders of the defined types were placed in this encounter. Chief complaint/reason for consultation:     Chief Complaint   Patient presents with    Wound Infection     Jaw infection with drainage         History of Present Illness:   India Gary is a 78y.o.-year-old female who has a history of metastatic breast cancer and has exposed mandibular bone on the floor of the mouth on the right and had osteomyelitis/lytic lesions noted on CT imaging. It was not clear whether this represented osteonecrosis or this was osteomyelitis. I did see her in January 2021 and at that point in time I recommended that the patient be seen by oromaxillary surgery.   It is my understanding that the patient was seen in the March 19, 2021 and the diagnosis was that of osteonecrosis and the recommendation was for the patient to receive IV antimicrobial therapy [place], vitamin D, and chlorhexidine mouth daily      furosemide (LASIX) 40 MG tablet Take 1 tablet by mouth daily 60 tablet 3    albuterol (PROVENTIL) (2.5 MG/3ML) 0.083% nebulizer solution Take 3 mLs by nebulization every 2 hours as needed for Wheezing 120 each 3    ipratropium-albuterol (DUONEB) 0.5-2.5 (3) MG/3ML SOLN nebulizer solution Inhale 3 mLs into the lungs every 4 hours (while awake) 360 mL     apixaban (ELIQUIS) 5 MG TABS tablet Take 5 mg by mouth 2 times daily      aspirin 81 MG EC tablet Take 81 mg by mouth daily      Biotin 1 MG CAPS Take 1,000 mcg by mouth      denosumab (XGEVA) 120 MG/1.7ML SOLN SC injection Inject 120 mg into the skin every 30 days Given on the 16th of the month subq      FULVESTRANT IM Inject 500 mg into the muscle every 30 days On the 16th of the month      latanoprost (XALATAN) 0.005 % ophthalmic solution Place 1 drop into both eyes nightly      megestrol (MEGACE) 40 MG tablet Take 40 mg by mouth 2 times daily      metFORMIN (GLUCOPHAGE) 500 MG tablet Take 500 mg by mouth 2 times daily (with meals)      omeprazole (PRILOSEC) 20 MG delayed release capsule Take 20 mg by mouth daily       No current facility-administered medications for this visit. Allergies:   Betadine [povidone iodine], Betaine, and Ciprofloxacin     Review of Systems:   Review of Systems   Constitutional: Negative. Respiratory: Negative. Cardiovascular: Negative. Gastrointestinal: Negative. Genitourinary: Negative. Musculoskeletal: Negative. Skin: Positive for wound. Neurological: Negative. Psychiatric/Behavioral: Negative. Physical Examination :   /62 (Site: Left Lower Arm, Position: Sitting, Cuff Size: Medium Adult)   Pulse 62   Temp 96.2 °F (35.7 °C) (Temporal)   Resp 16   Ht 5' 7\" (1.702 m)   Wt 221 lb (100.2 kg)   SpO2 94% Comment: room air at rest  BMI 34.61 kg/m²     Physical Exam  Constitutional:       Appearance: She is well-developed. She is obese.    HENT:      Head: Normocephalic and atraumatic. Mouth/Throat:      Comments: There is exposed mandibular bone on the right jaw. Neck:      Musculoskeletal: Neck supple. Cardiovascular:      Rate and Rhythm: Regular rhythm. Heart sounds: Normal heart sounds. Pulmonary:      Effort: Pulmonary effort is normal.      Breath sounds: Normal breath sounds. Abdominal:      General: Bowel sounds are normal.      Palpations: Abdomen is soft. Skin:     General: Skin is warm. Comments: There is a sinus tract by the right mandible noted   Neurological:      Mental Status: She is alert and oriented to person, place, and time. Laboratory studies :  Medical Decision Making:   I have independently reviewed the following labs:  CBC with Differential:  Lab Results   Component Value Date    WBC 7.9 12/09/2020    WBC 7.1 12/07/2020    HGB 13.1 12/09/2020    HGB 12.4 12/07/2020    HCT 42.4 12/09/2020    HCT 38.6 12/07/2020     12/09/2020     12/07/2020    LYMPHOPCT 8 12/03/2020    MONOPCT 5 12/03/2020       BMP:  Lab Results   Component Value Date     12/09/2020     12/08/2020    K 3.8 12/09/2020    K 3.4 12/08/2020    CL 94 12/09/2020    CL 91 12/08/2020    CO2 45 12/09/2020    CO2 44 12/08/2020    BUN 37 12/09/2020    BUN 37 12/08/2020    CREATININE 0.86 12/09/2020    CREATININE 0.87 12/08/2020    MG 1.8 12/06/2020    MG 1.5 12/05/2020       Hepatic Function Panel: No results found for: PROT, LABALBU, BILIDIR, IBILI, BILITOT, ALKPHOS, ALT, AST    No results found for: CRP  No results found for: SEDRATE      Thank you for allowing us to participate in the care of this patient. Pleasecall with questions.     Dre Corbett MD  [employer] messaging: [phone]    This note is created with the assistance of a speech recognition program.  While intending to generate a document that actually reflects the content ofthe visit, the document can still have some errors including those of syntax and sound a like substitutions which may escape proof reading. It such instances, actual meaning can be extrapolated by contextual diversion.

## 2021-06-08 ENCOUNTER — TELEPHONE (OUTPATIENT)
Dept: INFECTIOUS DISEASES | Age: 71
End: 2021-06-08